# Patient Record
Sex: FEMALE | Race: WHITE | Employment: OTHER | URBAN - METROPOLITAN AREA
[De-identification: names, ages, dates, MRNs, and addresses within clinical notes are randomized per-mention and may not be internally consistent; named-entity substitution may affect disease eponyms.]

---

## 2018-08-10 ENCOUNTER — HOSPITAL ENCOUNTER (OUTPATIENT)
Dept: GENERAL RADIOLOGY | Age: 73
Discharge: HOME | End: 2018-08-10
Attending: INTERNAL MEDICINE | Admitting: INTERNAL MEDICINE

## 2018-08-10 LAB
ANION GAP SERPL CALCULATED.3IONS-SCNC: 13 MMOL/L (ref 4–16)
APTT: 30 SECONDS (ref 21.2–33)
BASOPHILS ABSOLUTE: 0 K/CU MM
BASOPHILS RELATIVE PERCENT: 0.8 % (ref 0–1)
BUN BLDV-MCNC: 18 MG/DL (ref 6–23)
CALCIUM SERPL-MCNC: 9.4 MG/DL (ref 8.3–10.6)
CHLORIDE BLD-SCNC: 94 MMOL/L (ref 99–110)
CO2: 31 MMOL/L (ref 21–32)
CREAT SERPL-MCNC: 0.9 MG/DL (ref 0.6–1.1)
DIFFERENTIAL TYPE: ABNORMAL
EOSINOPHILS ABSOLUTE: 0.1 K/CU MM
EOSINOPHILS RELATIVE PERCENT: 1 % (ref 0–3)
GFR AFRICAN AMERICAN: >60 ML/MIN/1.73M2
GFR NON-AFRICAN AMERICAN: >60 ML/MIN/1.73M2
GLUCOSE BLD-MCNC: 113 MG/DL (ref 70–99)
HCT VFR BLD CALC: 41.8 % (ref 37–47)
HEMOGLOBIN: 13.7 GM/DL (ref 12.5–16)
IMMATURE NEUTROPHIL %: 1 % (ref 0–0.43)
INR BLD: 0.91 INDEX
LYMPHOCYTES ABSOLUTE: 1.4 K/CU MM
LYMPHOCYTES RELATIVE PERCENT: 27.2 % (ref 24–44)
MCH RBC QN AUTO: 31.9 PG (ref 27–31)
MCHC RBC AUTO-ENTMCNC: 32.8 % (ref 32–36)
MCV RBC AUTO: 97.2 FL (ref 78–100)
MONOCYTES ABSOLUTE: 0.7 K/CU MM
MONOCYTES RELATIVE PERCENT: 14.2 % (ref 0–4)
NUCLEATED RBC %: 0 %
PDW BLD-RTO: 12.8 % (ref 11.7–14.9)
PLATELET # BLD: 264 K/CU MM (ref 140–440)
PMV BLD AUTO: 9 FL (ref 7.5–11.1)
POTASSIUM SERPL-SCNC: 3.4 MMOL/L (ref 3.5–5.1)
PROTHROMBIN TIME: 10.4 SECONDS (ref 9.12–12.5)
RBC # BLD: 4.3 M/CU MM (ref 4.2–5.4)
SEGMENTED NEUTROPHILS ABSOLUTE COUNT: 2.9 K/CU MM
SEGMENTED NEUTROPHILS RELATIVE PERCENT: 55.8 % (ref 36–66)
SODIUM BLD-SCNC: 138 MMOL/L (ref 135–145)
TOTAL IMMATURE NEUTOROPHIL: 0.05 K/CU MM
TOTAL NUCLEATED RBC: 0 K/CU MM
WBC # BLD: 5.2 K/CU MM (ref 4–10.5)

## 2018-10-15 ENCOUNTER — HOSPITAL ENCOUNTER (OUTPATIENT)
Dept: CT IMAGING | Age: 73
Discharge: HOME | End: 2018-10-15
Payer: COMMERCIAL

## 2018-10-15 ENCOUNTER — HOSPITAL ENCOUNTER (OUTPATIENT)
Dept: NUCLEAR MEDICINE | Age: 73
Discharge: HOME | End: 2018-10-15
Payer: COMMERCIAL

## 2018-10-15 ENCOUNTER — HOSPITAL ENCOUNTER (OUTPATIENT)
Dept: GENERAL RADIOLOGY | Age: 73
Discharge: HOME | End: 2018-10-15
Payer: COMMERCIAL

## 2018-10-15 ENCOUNTER — HOSPITAL ENCOUNTER (OUTPATIENT)
Age: 73
Discharge: HOME | End: 2018-10-15
Payer: COMMERCIAL

## 2018-10-15 DIAGNOSIS — C50.812 MALIGNANT NEOPLASM OF MIDLINE OF LEFT BREAST (HCC): ICD-10-CM

## 2018-10-15 DIAGNOSIS — C50.812 MALIGNANT NEOPLASM OF OVERLAPPING SITES OF LEFT FEMALE BREAST, UNSPECIFIED ESTROGEN RECEPTOR STATUS (HCC): ICD-10-CM

## 2018-10-15 LAB
GFR AFRICAN AMERICAN: >60 ML/MIN/1.73M2
GFR NON-AFRICAN AMERICAN: >60 ML/MIN/1.73M2
POC CREATININE: 0.9 MG/DL (ref 0.6–1.1)

## 2018-10-15 PROCEDURE — A9503 TC99M MEDRONATE: HCPCS | Performed by: SURGERY

## 2018-10-15 PROCEDURE — 74177 CT ABD & PELVIS W/CONTRAST: CPT

## 2018-10-15 PROCEDURE — 71046 X-RAY EXAM CHEST 2 VIEWS: CPT

## 2018-10-15 PROCEDURE — 6360000004 HC RX CONTRAST MEDICATION: Performed by: SURGERY

## 2018-10-15 PROCEDURE — 78306 BONE IMAGING WHOLE BODY: CPT

## 2018-10-15 PROCEDURE — 2580000003 HC RX 258: Performed by: SURGERY

## 2018-10-15 PROCEDURE — 3430000000 HC RX DIAGNOSTIC RADIOPHARMACEUTICAL: Performed by: SURGERY

## 2018-10-15 RX ORDER — TC 99M MEDRONATE 20 MG/10ML
25 INJECTION, POWDER, LYOPHILIZED, FOR SOLUTION INTRAVENOUS
Status: COMPLETED | OUTPATIENT
Start: 2018-10-15 | End: 2018-10-15

## 2018-10-15 RX ORDER — SODIUM CHLORIDE 0.9 % (FLUSH) 0.9 %
10 SYRINGE (ML) INJECTION PRN
Status: DISCONTINUED | OUTPATIENT
Start: 2018-10-15 | End: 2018-10-16 | Stop reason: HOSPADM

## 2018-10-15 RX ADMIN — IOPAMIDOL 75 ML: 755 INJECTION, SOLUTION INTRAVENOUS at 11:16

## 2018-10-15 RX ADMIN — SODIUM CHLORIDE, PRESERVATIVE FREE 10 ML: 5 INJECTION INTRAVENOUS at 11:16

## 2018-10-15 RX ADMIN — TC 99M MEDRONATE 25 MILLICURIE: 20 INJECTION, POWDER, LYOPHILIZED, FOR SOLUTION INTRAVENOUS at 10:30

## 2018-11-28 ENCOUNTER — HOSPITAL ENCOUNTER (OUTPATIENT)
Age: 73
Setting detail: SPECIMEN
Discharge: HOME | End: 2018-11-28
Payer: COMMERCIAL

## 2018-11-28 LAB
ALBUMIN SERPL-MCNC: 4.5 GM/DL (ref 3.4–5)
ALP BLD-CCNC: 73 IU/L (ref 40–128)
ALT SERPL-CCNC: 16 U/L (ref 10–40)
ANION GAP SERPL CALCULATED.3IONS-SCNC: 14 MMOL/L (ref 4–16)
AST SERPL-CCNC: 17 IU/L (ref 15–37)
BILIRUB SERPL-MCNC: 0.3 MG/DL (ref 0–1)
BUN BLDV-MCNC: 22 MG/DL (ref 6–23)
CALCIUM SERPL-MCNC: 9.6 MG/DL (ref 8.3–10.6)
CHLORIDE BLD-SCNC: 96 MMOL/L (ref 99–110)
CO2: 31 MMOL/L (ref 21–32)
CREAT SERPL-MCNC: 0.9 MG/DL (ref 0.6–1.1)
GFR AFRICAN AMERICAN: >60 ML/MIN/1.73M2
GFR NON-AFRICAN AMERICAN: >60 ML/MIN/1.73M2
GLUCOSE BLD-MCNC: 112 MG/DL (ref 70–99)
POTASSIUM SERPL-SCNC: 3.7 MMOL/L (ref 3.5–5.1)
SODIUM BLD-SCNC: 141 MMOL/L (ref 135–145)
TOTAL PROTEIN: 6.8 GM/DL (ref 6.4–8.2)

## 2018-11-28 PROCEDURE — 80053 COMPREHEN METABOLIC PANEL: CPT

## 2019-01-10 ENCOUNTER — HOSPITAL ENCOUNTER (OUTPATIENT)
Age: 74
Setting detail: SPECIMEN
Discharge: HOME | End: 2019-01-10
Payer: COMMERCIAL

## 2019-01-10 LAB
ALBUMIN SERPL-MCNC: 4.2 GM/DL (ref 3.4–5)
ALP BLD-CCNC: 91 IU/L (ref 40–128)
ALT SERPL-CCNC: 26 U/L (ref 10–40)
ANION GAP SERPL CALCULATED.3IONS-SCNC: 15 MMOL/L (ref 4–16)
AST SERPL-CCNC: 17 IU/L (ref 15–37)
BILIRUB SERPL-MCNC: 0.2 MG/DL (ref 0–1)
BUN BLDV-MCNC: 30 MG/DL (ref 6–23)
CALCIUM SERPL-MCNC: 9.2 MG/DL (ref 8.3–10.6)
CHLORIDE BLD-SCNC: 96 MMOL/L (ref 99–110)
CO2: 26 MMOL/L (ref 21–32)
CREAT SERPL-MCNC: 1 MG/DL (ref 0.6–1.1)
GFR AFRICAN AMERICAN: >60 ML/MIN/1.73M2
GFR NON-AFRICAN AMERICAN: 54 ML/MIN/1.73M2
GLUCOSE BLD-MCNC: 96 MG/DL (ref 70–99)
POTASSIUM SERPL-SCNC: 3.8 MMOL/L (ref 3.5–5.1)
SODIUM BLD-SCNC: 137 MMOL/L (ref 135–145)
TOTAL PROTEIN: 6.7 GM/DL (ref 6.4–8.2)

## 2019-01-10 PROCEDURE — 80053 COMPREHEN METABOLIC PANEL: CPT

## 2019-08-27 ENCOUNTER — HOSPITAL ENCOUNTER (OUTPATIENT)
Age: 74
Setting detail: SPECIMEN
Discharge: HOME | End: 2019-08-27
Payer: COMMERCIAL

## 2019-08-27 LAB
ALBUMIN SERPL-MCNC: 4.5 GM/DL (ref 3.4–5)
ALP BLD-CCNC: 55 IU/L (ref 40–128)
ALT SERPL-CCNC: 23 U/L (ref 10–40)
ANION GAP SERPL CALCULATED.3IONS-SCNC: 15 MMOL/L (ref 4–16)
AST SERPL-CCNC: 24 IU/L (ref 15–37)
BILIRUB SERPL-MCNC: 0.3 MG/DL (ref 0–1)
BUN BLDV-MCNC: 19 MG/DL (ref 6–23)
CALCIUM SERPL-MCNC: 9.7 MG/DL (ref 8.3–10.6)
CHLORIDE BLD-SCNC: 95 MMOL/L (ref 99–110)
CO2: 28 MMOL/L (ref 21–32)
CREAT SERPL-MCNC: 1 MG/DL (ref 0.6–1.1)
GFR AFRICAN AMERICAN: >60 ML/MIN/1.73M2
GFR NON-AFRICAN AMERICAN: 54 ML/MIN/1.73M2
GLUCOSE BLD-MCNC: 100 MG/DL (ref 70–99)
POTASSIUM SERPL-SCNC: 3.2 MMOL/L (ref 3.5–5.1)
SODIUM BLD-SCNC: 138 MMOL/L (ref 135–145)
TOTAL PROTEIN: 6.8 GM/DL (ref 6.4–8.2)

## 2019-08-27 PROCEDURE — 80053 COMPREHEN METABOLIC PANEL: CPT

## 2020-03-03 ENCOUNTER — HOSPITAL ENCOUNTER (OUTPATIENT)
Dept: INFUSION THERAPY | Age: 75
Discharge: HOME | End: 2020-03-03
Payer: COMMERCIAL

## 2020-03-03 LAB
ALBUMIN SERPL-MCNC: 4.2 GM/DL (ref 3.4–5)
ALP BLD-CCNC: 74 IU/L (ref 40–128)
ALT SERPL-CCNC: 11 U/L (ref 10–40)
ANION GAP SERPL CALCULATED.3IONS-SCNC: 15 MMOL/L (ref 4–16)
AST SERPL-CCNC: 21 IU/L (ref 15–37)
BASOPHILS ABSOLUTE: 0.1 K/CU MM
BASOPHILS RELATIVE PERCENT: 0.8 % (ref 0–1)
BILIRUB SERPL-MCNC: 0.2 MG/DL (ref 0–1)
BUN BLDV-MCNC: 25 MG/DL (ref 6–23)
CALCIUM SERPL-MCNC: 9.3 MG/DL (ref 8.3–10.6)
CHLORIDE BLD-SCNC: 101 MMOL/L (ref 99–110)
CO2: 24 MMOL/L (ref 21–32)
CREAT SERPL-MCNC: 1 MG/DL (ref 0.6–1.1)
DIFFERENTIAL TYPE: ABNORMAL
EOSINOPHILS ABSOLUTE: 0.2 K/CU MM
EOSINOPHILS RELATIVE PERCENT: 2.6 % (ref 0–3)
GFR AFRICAN AMERICAN: >60 ML/MIN/1.73M2
GFR NON-AFRICAN AMERICAN: 54 ML/MIN/1.73M2
GLUCOSE BLD-MCNC: 125 MG/DL (ref 70–99)
HCT VFR BLD CALC: 38.7 % (ref 37–47)
HEMOGLOBIN: 13.2 GM/DL (ref 12.5–16)
LYMPHOCYTES ABSOLUTE: 2 K/CU MM
LYMPHOCYTES RELATIVE PERCENT: 31.3 % (ref 24–44)
MCH RBC QN AUTO: 30.3 PG (ref 27–31)
MCHC RBC AUTO-ENTMCNC: 34.1 % (ref 32–36)
MCV RBC AUTO: 88.8 FL (ref 78–100)
MONOCYTES ABSOLUTE: 0.6 K/CU MM
MONOCYTES RELATIVE PERCENT: 8.8 % (ref 0–4)
PDW BLD-RTO: 13.5 % (ref 11.7–14.9)
PLATELET # BLD: 295 K/CU MM (ref 140–440)
PMV BLD AUTO: 9.2 FL (ref 7.5–11.1)
POTASSIUM SERPL-SCNC: 3.8 MMOL/L (ref 3.5–5.1)
RBC # BLD: 4.36 M/CU MM (ref 4.2–5.4)
SEGMENTED NEUTROPHILS ABSOLUTE COUNT: 3.7 K/CU MM
SEGMENTED NEUTROPHILS RELATIVE PERCENT: 56.5 % (ref 36–66)
SODIUM BLD-SCNC: 140 MMOL/L (ref 135–145)
TOTAL PROTEIN: 6.9 GM/DL (ref 6.4–8.2)
WBC # BLD: 6.5 K/CU MM (ref 4–10.5)

## 2020-03-03 PROCEDURE — 85025 COMPLETE CBC W/AUTO DIFF WBC: CPT

## 2020-03-03 PROCEDURE — 80053 COMPREHEN METABOLIC PANEL: CPT

## 2020-03-03 PROCEDURE — 36415 COLL VENOUS BLD VENIPUNCTURE: CPT

## 2020-08-14 PROBLEM — C50.412 MALIGNANT NEOPLASM OF UPPER-OUTER QUADRANT OF LEFT FEMALE BREAST (HCC): Status: ACTIVE | Noted: 2020-08-14

## 2020-09-28 ENCOUNTER — OFFICE VISIT (OUTPATIENT)
Dept: ONCOLOGY | Age: 75
End: 2020-09-28
Payer: COMMERCIAL

## 2020-09-28 ENCOUNTER — HOSPITAL ENCOUNTER (OUTPATIENT)
Dept: INFUSION THERAPY | Age: 75
Discharge: HOME | End: 2020-09-28
Payer: COMMERCIAL

## 2020-09-28 VITALS
BODY MASS INDEX: 34.47 KG/M2 | HEIGHT: 58 IN | DIASTOLIC BLOOD PRESSURE: 81 MMHG | HEART RATE: 73 BPM | WEIGHT: 164.2 LBS | TEMPERATURE: 97.4 F | SYSTOLIC BLOOD PRESSURE: 137 MMHG | OXYGEN SATURATION: 97 %

## 2020-09-28 PROCEDURE — 99214 OFFICE O/P EST MOD 30 MIN: CPT | Performed by: INTERNAL MEDICINE

## 2020-09-28 PROCEDURE — 99211 OFF/OP EST MAY X REQ PHY/QHP: CPT

## 2020-09-28 RX ORDER — TAMOXIFEN CITRATE 10 MG/1
10 TABLET ORAL DAILY
Qty: 90 TABLET | Refills: 5 | Status: SHIPPED | OUTPATIENT
Start: 2020-09-28 | End: 2020-11-18 | Stop reason: SDUPTHER

## 2020-09-28 ASSESSMENT — PATIENT HEALTH QUESTIONNAIRE - PHQ9
2. FEELING DOWN, DEPRESSED OR HOPELESS: 0
SUM OF ALL RESPONSES TO PHQ9 QUESTIONS 1 & 2: 0
SUM OF ALL RESPONSES TO PHQ QUESTIONS 1-9: 0
SUM OF ALL RESPONSES TO PHQ QUESTIONS 1-9: 0
1. LITTLE INTEREST OR PLEASURE IN DOING THINGS: 0

## 2020-09-28 NOTE — PROGRESS NOTES
MA Rooming Questions  Patient: Nay Amaya  MRN: [id number]    Date: 9/28/2020        1. Do you have any new issues? yes - broken radius and alna         2. Do you need any refills on medications?    no    3. Have you had any imaging done since your last visit? yes - labwork    4. Have you been hospitalized or seen in the emergency room since your last visit here?   no    5. Did the patient have a depression screening completed today?  Yes    PHQ-9 Total Score: 0 (9/28/2020  3:31 PM)       PHQ-9 Given to (if applicable):               PHQ-9 Score (if applicable):                     [] Positive     []  Negative              Does question #9 need addressed (if applicable)                     [] Yes    []  No               Adam Dent

## 2020-11-18 ENCOUNTER — TELEPHONE (OUTPATIENT)
Dept: ONCOLOGY | Age: 75
End: 2020-11-18

## 2020-11-18 RX ORDER — TAMOXIFEN CITRATE 10 MG/1
10 TABLET ORAL DAILY
Qty: 90 TABLET | Refills: 5 | Status: SHIPPED | OUTPATIENT
Start: 2020-11-18 | End: 2020-11-23

## 2020-11-23 RX ORDER — TAMOXIFEN CITRATE 20 MG/1
20 TABLET ORAL DAILY
Qty: 30 TABLET | Refills: 2 | Status: SHIPPED | OUTPATIENT
Start: 2020-11-23 | End: 2021-03-29 | Stop reason: SDUPTHER

## 2021-01-06 ENCOUNTER — TELEPHONE (OUTPATIENT)
Dept: ONCOLOGY | Age: 76
End: 2021-01-06

## 2021-01-08 NOTE — TELEPHONE ENCOUNTER
Called and spoke with patient who states she received a letter from [facility] that they were going to change her prescribed Tamoxifen from a Tier 1 medication to a Tier 2 which would cost her more. Called and spoke with Ana Foss at [facility] and she states that there is no change for the patient - that it will stay at a Tier 1 with a $5.00 monthly co-pay. Called and spoke with patient to inform her of this - voiced understanding. Instructed patient that if she receives another letter like this to call us back and we will call [facility] again.

## 2021-03-22 ENCOUNTER — HOSPITAL ENCOUNTER (OUTPATIENT)
Dept: INFUSION THERAPY | Age: 76
Discharge: HOME | End: 2021-03-22
Payer: COMMERCIAL

## 2021-03-22 DIAGNOSIS — C50.412 MALIGNANT NEOPLASM OF UPPER-OUTER QUADRANT OF LEFT FEMALE BREAST, UNSPECIFIED ESTROGEN RECEPTOR STATUS (HCC): ICD-10-CM

## 2021-03-22 LAB
ALBUMIN SERPL-MCNC: 4.2 GM/DL (ref 3.4–5)
ALP BLD-CCNC: 63 IU/L (ref 40–128)
ALT SERPL-CCNC: 13 U/L (ref 10–40)
ANION GAP SERPL CALCULATED.3IONS-SCNC: 13 MMOL/L (ref 4–16)
AST SERPL-CCNC: 16 IU/L (ref 15–37)
BASOPHILS ABSOLUTE: 0.1 K/CU MM
BASOPHILS RELATIVE PERCENT: 0.6 % (ref 0–1)
BILIRUB SERPL-MCNC: 0.2 MG/DL (ref 0–1)
BUN BLDV-MCNC: 21 MG/DL (ref 6–23)
CALCIUM SERPL-MCNC: 9.1 MG/DL (ref 8.3–10.6)
CHLORIDE BLD-SCNC: 97 MMOL/L (ref 99–110)
CO2: 27 MMOL/L (ref 21–32)
CREAT SERPL-MCNC: 1.1 MG/DL (ref 0.6–1.1)
DIFFERENTIAL TYPE: ABNORMAL
EOSINOPHILS ABSOLUTE: 0.2 K/CU MM
EOSINOPHILS RELATIVE PERCENT: 2.2 % (ref 0–3)
GFR AFRICAN AMERICAN: 58 ML/MIN/1.73M2
GFR NON-AFRICAN AMERICAN: 48 ML/MIN/1.73M2
GLUCOSE BLD-MCNC: 97 MG/DL (ref 70–99)
HCT VFR BLD CALC: 36.9 % (ref 37–47)
HEMOGLOBIN: 12.5 GM/DL (ref 12.5–16)
LYMPHOCYTES ABSOLUTE: 2.1 K/CU MM
LYMPHOCYTES RELATIVE PERCENT: 24 % (ref 24–44)
MCH RBC QN AUTO: 30 PG (ref 27–31)
MCHC RBC AUTO-ENTMCNC: 33.9 % (ref 32–36)
MCV RBC AUTO: 88.7 FL (ref 78–100)
MONOCYTES ABSOLUTE: 0.9 K/CU MM
MONOCYTES RELATIVE PERCENT: 10.6 % (ref 0–4)
PDW BLD-RTO: 13.5 % (ref 11.7–14.9)
PLATELET # BLD: 282 K/CU MM (ref 140–440)
PMV BLD AUTO: 8.9 FL (ref 7.5–11.1)
POTASSIUM SERPL-SCNC: 4 MMOL/L (ref 3.5–5.1)
RBC # BLD: 4.16 M/CU MM (ref 4.2–5.4)
SEGMENTED NEUTROPHILS ABSOLUTE COUNT: 5.4 K/CU MM
SEGMENTED NEUTROPHILS RELATIVE PERCENT: 62.6 % (ref 36–66)
SODIUM BLD-SCNC: 137 MMOL/L (ref 135–145)
TOTAL PROTEIN: 6.7 GM/DL (ref 6.4–8.2)
WBC # BLD: 8.6 K/CU MM (ref 4–10.5)

## 2021-03-22 PROCEDURE — 86300 IMMUNOASSAY TUMOR CA 15-3: CPT

## 2021-03-22 PROCEDURE — 85025 COMPLETE CBC W/AUTO DIFF WBC: CPT

## 2021-03-22 PROCEDURE — 80053 COMPREHEN METABOLIC PANEL: CPT

## 2021-03-22 PROCEDURE — 36415 COLL VENOUS BLD VENIPUNCTURE: CPT

## 2021-03-24 LAB — CA 27.29: 44.2 U/ML (ref 0–40)

## 2021-03-29 ENCOUNTER — HOSPITAL ENCOUNTER (OUTPATIENT)
Dept: INFUSION THERAPY | Age: 76
Discharge: HOME | End: 2021-03-29
Payer: COMMERCIAL

## 2021-03-29 ENCOUNTER — OFFICE VISIT (OUTPATIENT)
Dept: ONCOLOGY | Age: 76
End: 2021-03-29
Payer: COMMERCIAL

## 2021-03-29 VITALS
HEART RATE: 69 BPM | DIASTOLIC BLOOD PRESSURE: 74 MMHG | HEIGHT: 58 IN | WEIGHT: 169.2 LBS | BODY MASS INDEX: 35.52 KG/M2 | RESPIRATION RATE: 16 BRPM | TEMPERATURE: 98.6 F | SYSTOLIC BLOOD PRESSURE: 147 MMHG | OXYGEN SATURATION: 95 %

## 2021-03-29 DIAGNOSIS — C50.412 MALIGNANT NEOPLASM OF UPPER-OUTER QUADRANT OF LEFT FEMALE BREAST, UNSPECIFIED ESTROGEN RECEPTOR STATUS (HCC): Primary | ICD-10-CM

## 2021-03-29 DIAGNOSIS — Z78.0 MENOPAUSE: ICD-10-CM

## 2021-03-29 PROCEDURE — 99214 OFFICE O/P EST MOD 30 MIN: CPT | Performed by: INTERNAL MEDICINE

## 2021-03-29 PROCEDURE — 99211 OFF/OP EST MAY X REQ PHY/QHP: CPT

## 2021-03-29 RX ORDER — TAMOXIFEN CITRATE 20 MG/1
20 TABLET ORAL DAILY
Qty: 90 TABLET | Refills: 5 | Status: SHIPPED | OUTPATIENT
Start: 2021-03-29 | End: 2022-04-11

## 2021-03-29 NOTE — PROGRESS NOTES
MA Rooming Questions  Patient: Gareth Moreno  MRN: [id number]    Date: 3/29/2021        1. Do you have any new issues?   no         2. Do you need any refills on medications? yes - tamoxifen to [facility]    3. Have you had any imaging done since your last visit?   no    4. Have you been hospitalized or seen in the emergency room since your last visit here?   no    5. Did the patient have a depression screening completed today?  No    No data recorded     PHQ-9 Given to (if applicable):               PHQ-9 Score (if applicable):                     [] Positive     []  Negative              Does question #9 need addressed (if applicable)                     [] Yes    []  No               Lois Cobian MA

## 2021-03-29 NOTE — PROGRESS NOTES
Patient Name: Lawrence Boothe  Patient : 1945  Patient MRN: [id number]     Primary Oncologist: Arturo Power MD       Date of Service: 3/29/2021      Chief Complaint:   Chief Complaint   Patient presents with    Follow-up        Active Problem list  1. 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 .Mammogram done on 2018 revealed a suspicious lesion involving the left breast. Ultrasound and biopsy was done revealing invasive ductal carcinoma at 11 o'clock position subsequently she underwent bilateral mastectomy on 2018 right breast only showed fibrocystic changes left breast 8 mm invasive ductal carcinoma histologic grade 3 ER +85% MD +15% HER-2 by FISH was negative negative for any lymphovascular invasion 7 axillary nodes were negative [id number]. Visit here on 2018 did spend time with her and her friend. We basically talked about early stage carcinoma of the breast. We talked about most likely just treating her with hormonal agent primarily letrozole. Bone density done on 2018 had revealed mild osteopenia    She was seen in the office on January 10, 2019. Was taking letrozole on a regular basis. In general had tolerated those extremely well occasional issue with hot flashes otherwise no significant muscle or joint discomfort. She was seen in the office on 2019, per Piedad Goodwin for symptoms of significant worsening of muscle and joint discomfort that has started to affect her day-to-day life. Decision was made to hold off letrozole for a couple of weeks. She was seen in the office on 2019 being off of letrozole she was doing extremely well.  She was able to do almost all of her day-to-day activities. Denied any other specific new systemic complaints. March 28, 2019 long discussion with her. We discussed underlying disease process and overall good prognosis. We talked about treatment choices at this stage including changing to a different AI or tamoxifen or even no additional treatment. We felt comfortable trying tamoxifen as she did not have any previous history of clots, smoking etc..    Saw me on March 3, 2020. Stated she has started to experience a few more symptoms of hot flashes couple of weeks ago possibly relating to her stopping Paxil. Emotionally also she has felt somewhat down being off of Paxil. Otherwise physically she was doing about the same fairly active at [employer], still doing certification for 1 nursing programs. Still some chronic back issues for which she had seen Dr. Terry Rater negative for any other new systemic complaints still being followed by Dr. 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 Left hand 8/27/0:1.  Diffuse osteopenia. 2.  Scattered degenerative changes most pronounced of the first carpometacarpal joint. 3. Glenard Presto impacted intra-articular comminuted fracture of the distal radius. Left wrist:1.  Diffuse osteopenia. 2.  Nondisplaced fracture ulnar styloid process. 3.  Comminuted intra-articular mildly impacted fracture distal radius. 4.  Moderate to severe degenerative changes first carpometacarpal joint. Left forearm:Comminuted mildly impacted distal radius fracture. No other fractures of the radius and ulna. Diffuse osteopenia. Interval History  3/29/2021: Arrived alone to the clinic today. Overall feels fine. No chest wall lesions or nay axillary LAD. No weight loss.  Hot flashes well controlled with paxil. Is on vitamin D. Denied any chest pain, increased shortness of breath, palpitations or dizziness. Denied any dysphagia odynophagia. Denied any headache, vision changes or any focal weakness. Denied any abdominal pain, nausea, vomiting, diarrhea, constipation. Denied any lower extremity edema. No new bone pains. Review of Systems   Per interval history; otherwise 10 point ROS is negative              Vital Signs:  BP (!) 147/74 (Site: Right Upper Arm, Position: Sitting, Cuff Size: Large Adult)   Pulse 69   Temp 98.6 °F (37 °C) (Temporal)   Resp 16   Ht 4' 10\" (1.473 m)   Wt 169 lb 3.2 oz (76.7 kg)   SpO2 95%   BMI 35.36 kg/m²     Physical Exam:    CONSTITUTIONAL: awake, alert, short statured  EYES: KRISTEN, no palor or any icetrus  ENT: ATNC  NECK: No JVD  HEMATOLOGIC/LYMPHATIC: NO LAD  LUNGS: CTAB  CARDIOVASCULAR: s1s2 rrr ESM  ABDOMEN: soft ntnd bs pos  NEUROLOGIC:GI  SKIN: No rash  EXTREMITIES: no LE edema bilaterally  Bilateral mastectomy sites with no overlying skin changes.      Labs:    Hematology:  Lab Results   Component Value Date    WBC 8.6 03/22/2021    RBC 4.16 (L) 03/22/2021    HGB 12.5 03/22/2021    HCT 36.9 (L) 03/22/2021    MCV 88.7 03/22/2021    MCH 30.0 03/22/2021    MCHC 33.9 03/22/2021    RDW 13.5 03/22/2021     03/22/2021    MPV 8.9 03/22/2021    SEGSPCT 62.6 03/22/2021    EOSRELPCT 2.2 03/22/2021    BASOPCT 0.6 03/22/2021    LYMPHOPCT 24.0 03/22/2021    MONOPCT 10.6 (H) 03/22/2021    SEGSABS 5.4 03/22/2021    EOSABS 0.2 03/22/2021    BASOSABS 0.1 03/22/2021    LYMPHSABS 2.1 03/22/2021    MONOSABS 0.9 03/22/2021    DIFFTYPE AUTOMATED DIFFERENTIAL 03/22/2021     No results found for: ESR    Chemistry:  Lab Results   Component Value Date     03/22/2021    K 4.0 03/22/2021    CL 97 (L) 03/22/2021    CO2 27 03/22/2021    BUN 21 03/22/2021    CREATININE 1.1 03/22/2021    GLUCOSE 97 03/22/2021    CALCIUM 9.1 03/22/2021    PROT 6.7 03/22/2021    LABALBU 4.2 03/22/2021    BILITOT 0.2 03/22/2021    ALKPHOS 63 03/22/2021    AST 16 03/22/2021    ALT 13 03/22/2021    LABGLOM 48 (L) 03/22/2021    GFRAA 58 (L) 03/22/2021     No results found for: MMA, LDH, HOMOCYSTEINE  No components found for: LD  Lab Results   Component Value Date    TSHHS 0.022 (L) 08/06/2011    T4FREE 1.23 08/06/2011    FT3 3.0 08/06/2011       Immunology:  Lab Results   Component Value Date    PROT 6.7 03/22/2021     No results found for: Prieto Rook, KLFLCR  No results found for: B2M    Coagulation Panel:  Lab Results   Component Value Date    PROTIME 10.4 08/10/2018    INR 0.91 08/10/2018    APTT 30.0 08/10/2018       Anemia Panel:  No results found for: Negro Maldonado    Tumor Markers:  Lab Results   Component Value Date    LABCA2 44.2 (H) 03/22/2021         Imaging: Reviewed     Pathology:Reviewed     Observations:  Performance Status: ECOG 0  Depression Status: No data recorded          Assessment & Plan:  Stage I IBC left: ER/TN positive Her 2 negative. S/p bilateral mastectomies on October 18 2018. Was on letrozole but had significant AE with joint and muscle pains, hence changed to tamoxifen looks like since march 2019. Tolerating fairly well as long as she is on Paxil for hot flashes. BDS with osteopenia. Is on Vitamin D but not calcium. Repeat Bone density scan due in 2021. Tamoxifen  planned to continue for total 5 years. Note CA 27-29 slightly elevated but no Signs or sx of recurrence , will repeat levels. H/o Hyponatremia and hypokalemia: Is on K replacement. Last CMP normal  Continue other medical care    Discussed healthy lifestyle including healthy diet, regular exercise as tolerate and weight loss if possible. Also discussed the importance of age appropriate screening tools. Due for colonoscopy and PAP this year. Received COVID vaccines. Discussed the above findings and plan with her and answered all questions    Recommend follow up with PCP and other specialists. Please do not hesitate to contact us if you need any further information.     RTC September 2021  or earlier if new Sx    ALEX     Electronically signed by Jenni Alonso MD on 3/29/2021 at 3:04 PM

## 2021-06-29 ENCOUNTER — HOSPITAL ENCOUNTER (OUTPATIENT)
Dept: INFUSION THERAPY | Age: 76
Discharge: HOME | End: 2021-06-29
Payer: COMMERCIAL

## 2021-06-29 DIAGNOSIS — C50.412 MALIGNANT NEOPLASM OF UPPER-OUTER QUADRANT OF LEFT FEMALE BREAST, UNSPECIFIED ESTROGEN RECEPTOR STATUS (HCC): ICD-10-CM

## 2021-06-29 LAB
BASOPHILS ABSOLUTE: 0 K/CU MM
BASOPHILS RELATIVE PERCENT: 0.5 % (ref 0–1)
DIFFERENTIAL TYPE: ABNORMAL
EOSINOPHILS ABSOLUTE: 0.2 K/CU MM
EOSINOPHILS RELATIVE PERCENT: 2.3 % (ref 0–3)
HCT VFR BLD CALC: 37.8 % (ref 37–47)
HEMOGLOBIN: 12.5 GM/DL (ref 12.5–16)
LYMPHOCYTES ABSOLUTE: 1.8 K/CU MM
LYMPHOCYTES RELATIVE PERCENT: 20.9 % (ref 24–44)
MCH RBC QN AUTO: 29.7 PG (ref 27–31)
MCHC RBC AUTO-ENTMCNC: 33.1 % (ref 32–36)
MCV RBC AUTO: 89.8 FL (ref 78–100)
MONOCYTES ABSOLUTE: 0.9 K/CU MM
MONOCYTES RELATIVE PERCENT: 10.1 % (ref 0–4)
PDW BLD-RTO: 13.9 % (ref 11.7–14.9)
PLATELET # BLD: 307 K/CU MM (ref 140–440)
PMV BLD AUTO: 9.6 FL (ref 7.5–11.1)
RBC # BLD: 4.21 M/CU MM (ref 4.2–5.4)
SEGMENTED NEUTROPHILS ABSOLUTE COUNT: 5.6 K/CU MM
SEGMENTED NEUTROPHILS RELATIVE PERCENT: 66.2 % (ref 36–66)
WBC # BLD: 8.4 K/CU MM (ref 4–10.5)

## 2021-06-29 PROCEDURE — 36415 COLL VENOUS BLD VENIPUNCTURE: CPT

## 2021-06-29 PROCEDURE — 85025 COMPLETE CBC W/AUTO DIFF WBC: CPT

## 2021-06-29 PROCEDURE — 86300 IMMUNOASSAY TUMOR CA 15-3: CPT

## 2021-07-01 LAB — CA 27.29: 49.7 U/ML (ref 0–40)

## 2021-08-24 ENCOUNTER — CLINICAL DOCUMENTATION (OUTPATIENT)
Dept: ONCOLOGY | Age: 76
End: 2021-08-24

## 2021-09-20 ENCOUNTER — HOSPITAL ENCOUNTER (OUTPATIENT)
Dept: INFUSION THERAPY | Age: 76
Discharge: HOME | End: 2021-09-20
Payer: COMMERCIAL

## 2021-09-20 ENCOUNTER — HOSPITAL ENCOUNTER (OUTPATIENT)
Age: 76
Setting detail: SPECIMEN
Discharge: HOME | End: 2021-09-20
Payer: COMMERCIAL

## 2021-09-20 DIAGNOSIS — C50.412 MALIGNANT NEOPLASM OF UPPER-OUTER QUADRANT OF LEFT FEMALE BREAST, UNSPECIFIED ESTROGEN RECEPTOR STATUS (HCC): ICD-10-CM

## 2021-09-20 LAB
ALBUMIN SERPL-MCNC: 4.4 GM/DL (ref 3.4–5)
ALP BLD-CCNC: 58 IU/L (ref 40–128)
ALT SERPL-CCNC: 15 U/L (ref 10–40)
ANION GAP SERPL CALCULATED.3IONS-SCNC: 15 MMOL/L (ref 4–16)
AST SERPL-CCNC: 19 IU/L (ref 15–37)
BASOPHILS ABSOLUTE: 0 K/CU MM
BASOPHILS RELATIVE PERCENT: 0.4 % (ref 0–1)
BILIRUB SERPL-MCNC: 0.3 MG/DL (ref 0–1)
BUN BLDV-MCNC: 19 MG/DL (ref 6–23)
CALCIUM SERPL-MCNC: 8.9 MG/DL (ref 8.3–10.6)
CHLORIDE BLD-SCNC: 98 MMOL/L (ref 99–110)
CHOLESTEROL: 183 MG/DL
CO2: 26 MMOL/L (ref 21–32)
CREAT SERPL-MCNC: 1 MG/DL (ref 0.6–1.1)
DIFFERENTIAL TYPE: ABNORMAL
EOSINOPHILS ABSOLUTE: 0.2 K/CU MM
EOSINOPHILS RELATIVE PERCENT: 2.7 % (ref 0–3)
GFR AFRICAN AMERICAN: >60 ML/MIN/1.73M2
GFR NON-AFRICAN AMERICAN: 54 ML/MIN/1.73M2
GLUCOSE BLD-MCNC: 109 MG/DL (ref 70–99)
HCT VFR BLD CALC: 37.5 % (ref 37–47)
HDLC SERPL-MCNC: 52 MG/DL
HEMOGLOBIN: 12.4 GM/DL (ref 12.5–16)
LDL CHOLESTEROL DIRECT: 98 MG/DL
LYMPHOCYTES ABSOLUTE: 2.3 K/CU MM
LYMPHOCYTES RELATIVE PERCENT: 32.3 % (ref 24–44)
MCH RBC QN AUTO: 29.5 PG (ref 27–31)
MCHC RBC AUTO-ENTMCNC: 33.1 % (ref 32–36)
MCV RBC AUTO: 89.1 FL (ref 78–100)
MONOCYTES ABSOLUTE: 0.7 K/CU MM
MONOCYTES RELATIVE PERCENT: 10 % (ref 0–4)
PDW BLD-RTO: 13.9 % (ref 11.7–14.9)
PLATELET # BLD: 313 K/CU MM (ref 140–440)
PMV BLD AUTO: 9.1 FL (ref 7.5–11.1)
POTASSIUM SERPL-SCNC: 3.6 MMOL/L (ref 3.5–5.1)
RBC # BLD: 4.21 M/CU MM (ref 4.2–5.4)
SEGMENTED NEUTROPHILS ABSOLUTE COUNT: 3.9 K/CU MM
SEGMENTED NEUTROPHILS RELATIVE PERCENT: 54.6 % (ref 36–66)
SODIUM BLD-SCNC: 139 MMOL/L (ref 135–145)
T4 FREE: 0.97 NG/DL (ref 0.9–1.8)
TOTAL PROTEIN: 6.8 GM/DL (ref 6.4–8.2)
TRIGL SERPL-MCNC: 212 MG/DL
TSH HIGH SENSITIVITY: 1.2 UIU/ML (ref 0.27–4.2)
WBC # BLD: 7.1 K/CU MM (ref 4–10.5)

## 2021-09-20 PROCEDURE — 83721 ASSAY OF BLOOD LIPOPROTEIN: CPT

## 2021-09-20 PROCEDURE — 36415 COLL VENOUS BLD VENIPUNCTURE: CPT

## 2021-09-20 PROCEDURE — 85025 COMPLETE CBC W/AUTO DIFF WBC: CPT

## 2021-09-20 PROCEDURE — 84439 ASSAY OF FREE THYROXINE: CPT

## 2021-09-20 PROCEDURE — 86300 IMMUNOASSAY TUMOR CA 15-3: CPT

## 2021-09-20 PROCEDURE — 80053 COMPREHEN METABOLIC PANEL: CPT

## 2021-09-20 PROCEDURE — 84443 ASSAY THYROID STIM HORMONE: CPT

## 2021-09-20 PROCEDURE — 80061 LIPID PANEL: CPT

## 2021-09-22 LAB — CA 27.29: 54.2 U/ML

## 2021-09-27 ENCOUNTER — OFFICE VISIT (OUTPATIENT)
Dept: ONCOLOGY | Age: 76
End: 2021-09-27
Payer: COMMERCIAL

## 2021-09-27 ENCOUNTER — HOSPITAL ENCOUNTER (OUTPATIENT)
Dept: INFUSION THERAPY | Age: 76
Discharge: HOME | End: 2021-09-27
Payer: COMMERCIAL

## 2021-09-27 VITALS
HEIGHT: 58 IN | BODY MASS INDEX: 35.35 KG/M2 | SYSTOLIC BLOOD PRESSURE: 154 MMHG | RESPIRATION RATE: 18 BRPM | HEART RATE: 84 BPM | TEMPERATURE: 99 F | DIASTOLIC BLOOD PRESSURE: 72 MMHG | OXYGEN SATURATION: 98 % | WEIGHT: 168.4 LBS

## 2021-09-27 DIAGNOSIS — M85.89 OSTEOPENIA OF MULTIPLE SITES: ICD-10-CM

## 2021-09-27 DIAGNOSIS — C50.412 MALIGNANT NEOPLASM OF UPPER-OUTER QUADRANT OF LEFT FEMALE BREAST, UNSPECIFIED ESTROGEN RECEPTOR STATUS (HCC): Primary | ICD-10-CM

## 2021-09-27 PROCEDURE — 99211 OFF/OP EST MAY X REQ PHY/QHP: CPT

## 2021-09-27 PROCEDURE — 99214 OFFICE O/P EST MOD 30 MIN: CPT | Performed by: INTERNAL MEDICINE

## 2021-09-27 RX ORDER — TOLTERODINE 4 MG/1
CAPSULE, EXTENDED RELEASE ORAL
COMMUNITY
Start: 2021-08-07 | End: 2022-08-15

## 2021-09-27 NOTE — PROGRESS NOTES
MA Rooming Questions  Patient: Jeniffer Michelle  MRN: [id number]    Date: 9/27/2021        1. Do you have any new issues? yes - spot under right arm         2. Do you need any refills on medications?    no    3. Have you had any imaging done since your last visit? yes - Dexa    4. Have you been hospitalized or seen in the emergency room since your last visit here?   no    5. Did the patient have a depression screening completed today?  No    No data recorded     PHQ-9 Given to (if applicable):               PHQ-9 Score (if applicable):                     [] Positive     []  Negative              Does question #9 need addressed (if applicable)                     [] Yes    []  No               Sari Amato CMA

## 2021-09-27 NOTE — PROGRESS NOTES
extremely well. She was able to do almost all of her day-to-day activities. Denied any other specific new systemic complaints. March 28, 2019 long discussion with her. We discussed underlying disease process and overall good prognosis. We talked about treatment choices at this stage including changing to a different AI or tamoxifen or even no additional treatment. We felt comfortable trying tamoxifen as she did not have any previous history of clots, smoking etc..    Saw me on March 3, 2020. Stated she has started to experience a few more symptoms of hot flashes couple of weeks ago possibly relating to her stopping Paxil. Emotionally also she has felt somewhat down being off of Paxil. 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 Still some chronic back issues for which she had seen Dr. Anh Marlow negative for any other new systemic complaints still being followed by Dr. 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 Left hand 8/27/0:1.  Diffuse osteopenia. 2.  Scattered degenerative changes most pronounced of the first carpometacarpal joint. 3. Williams Hema impacted intra-articular comminuted fracture of the distal radius. Left wrist:1.  Diffuse osteopenia. 2.  Nondisplaced fracture ulnar styloid process. 3.  Comminuted intra-articular mildly impacted fracture distal radius. 4.  Moderate to severe degenerative changes first carpometacarpal joint. Left forearm:Comminuted mildly impacted distal radius fracture. No other fractures of the radius and ulna. Diffuse osteopenia. 9/2021: Dexa scan with osteopenia. Interval History  9/27/2021: Arrived alone to the clinic today. Overall feels fine.  No chest wall lesions. Reported that she was told that she has Lipoma right axilla which has been growing in size. No pain. No  axillary LAD. No weight loss. Hot flashes tolerable other that few days of heavy sweats. Taking Paxil,helping some. Is on vitamin D. Denied any chest pain, increased shortness of breath, palpitations or dizziness. Denied any dysphagia odynophagia. Denied any headache, vision changes or any focal weakness. Denied any abdominal pain, nausea, vomiting, diarrhea, constipation. Denied any lower extremity edema. No new bone pains. Review of Systems   Per interval history; otherwise 10 point ROS is negative              Vital Signs:  BP (!) 154/72 (Site: Right Upper Arm, Position: Sitting, Cuff Size: Large Adult)   Pulse 84   Temp 99 °F (37.2 °C) (Temporal)   Resp 18   Ht 4' 10\" (1.473 m)   Wt 168 lb 6.4 oz (76.4 kg)   SpO2 98%   BMI 35.20 kg/m²     Physical Exam:    CONSTITUTIONAL: awake, alert, short statured  EYES: KRISTEN, no palor or any icetrus  ENT: ATNC  NECK: No JVD  HEMATOLOGIC/LYMPHATIC: NO LAD  LUNGS: CTAB  CARDIOVASCULAR: s1s2 rrr ESM  ABDOMEN: soft ntnd bs pos  NEUROLOGIC:GI  SKIN: No rash  EXTREMITIES: no LE edema bilaterally  Bilateral mastectomy sites with no overlying skin changes. No axillary lad bilaterally but ?lipoma left axillary area.  Non tender    Labs:    Hematology:  Lab Results   Component Value Date    WBC 7.1 09/20/2021    RBC 4.21 09/20/2021    HGB 12.4 (L) 09/20/2021    HCT 37.5 09/20/2021    MCV 89.1 09/20/2021    MCH 29.5 09/20/2021    MCHC 33.1 09/20/2021    RDW 13.9 09/20/2021     09/20/2021    MPV 9.1 09/20/2021    SEGSPCT 54.6 09/20/2021    EOSRELPCT 2.7 09/20/2021    BASOPCT 0.4 09/20/2021    LYMPHOPCT 32.3 09/20/2021    MONOPCT 10.0 (H) 09/20/2021    SEGSABS 3.9 09/20/2021    EOSABS 0.2 09/20/2021    BASOSABS 0.0 09/20/2021    LYMPHSABS 2.3 09/20/2021    MONOSABS 0.7 09/20/2021    DIFFTYPE AUTOMATED DIFFERENTIAL 09/20/2021     No results found for: ESR    Chemistry:  Lab Results   Component Value Date     09/20/2021    K 3.6 09/20/2021    CL 98 (L) 09/20/2021    CO2 26 09/20/2021    BUN 19 09/20/2021    CREATININE 1.0 09/20/2021    GLUCOSE 109 (H) 09/20/2021    CALCIUM 8.9 09/20/2021    PROT 6.8 09/20/2021    LABALBU 4.4 09/20/2021    BILITOT 0.3 09/20/2021    ALKPHOS 58 09/20/2021    AST 19 09/20/2021    ALT 15 09/20/2021    LABGLOM 54 (L) 09/20/2021    GFRAA >60 09/20/2021     No results found for: MMA, LDH, HOMOCYSTEINE  No components found for: LD  Lab Results   Component Value Date    TSHHS 1.200 09/20/2021    T4FREE 0.97 09/20/2021    FT3 3.0 08/06/2011       Immunology:  Lab Results   Component Value Date    PROT 6.8 09/20/2021     No results found for: Serjio Peoples, KLFLCR  No results found for: B2M    Coagulation Panel:  Lab Results   Component Value Date    PROTIME 10.4 08/10/2018    INR 0.91 08/10/2018    APTT 30.0 08/10/2018       Anemia Panel:  No results found for: [id number], FOLATE    Tumor Markers:  Lab Results   Component Value Date    LABCA2 54.2 (H) 09/20/2021         Imaging: Reviewed     Pathology:Reviewed     Observations:  Performance Status: ECOG 0  Depression Status: No data recorded          Assessment & Plan:  Stage I IBC left: ER/NM positive Her 2 negative. S/p bilateral mastectomies on October 18 2018. Was on letrozole but had significant AE with joint and muscle pains, hence changed to tamoxifen looks like since march 2019. Tolerating fairly well as long as she is on Paxil for hot flashes. BDS with osteopenia. Is on Vitamin D but not calcium. Repeat Bone density scan 2021 with osteopenia. Tamoxifen  planned to continue for total 5 years, until 2024. Note CA 27-29 continue to minimally rise. But no Signs or sx of recurrence. Recommend ultrasound rt axillary area for further evaluation    Osteopenia: Is on Vitamin D    H/O Hypokalemia: Most probably sec to Thiazide.     Hypertension: Most probably as she was rushing here from dentist office. But maintain LOG and salt restriction. Increased activity as tolerated. Discussed healthy lifestyle including healthy diet, regular exercise as tolerate and weight loss if possible. Also discussed the importance of age appropriate screening tools. Due for colonoscopy and PAP this year. Received COVID vaccines. Discussed the above findings and plan with her and answered all questions    Recommend follow up with PCP and other specialists. Please do not hesitate to contact us if you need any further information.     RTC jan 2021  or earlier if new Syung    ALEX

## 2021-09-29 ENCOUNTER — CLINICAL DOCUMENTATION (OUTPATIENT)
Dept: ONCOLOGY | Age: 76
End: 2021-09-29

## 2021-09-29 DIAGNOSIS — R22.31 LUMP OF AXILLA, RIGHT: Primary | ICD-10-CM

## 2021-09-29 NOTE — PROGRESS NOTES
Per Dr. Raymond Peterson - order placed for right axilla ultrasound to be scheduled at [facility].

## 2021-11-02 RX ORDER — PAROXETINE HYDROCHLORIDE 20 MG/1
TABLET, FILM COATED ORAL
Qty: 194 TABLET | Refills: 3 | Status: SHIPPED | OUTPATIENT
Start: 2021-11-02 | End: 2022-08-15 | Stop reason: SDUPTHER

## 2022-01-28 ENCOUNTER — HOSPITAL ENCOUNTER (OUTPATIENT)
Dept: INFUSION THERAPY | Age: 77
Discharge: HOME | End: 2022-01-28
Payer: COMMERCIAL

## 2022-01-28 DIAGNOSIS — C50.412 MALIGNANT NEOPLASM OF UPPER-OUTER QUADRANT OF LEFT FEMALE BREAST, UNSPECIFIED ESTROGEN RECEPTOR STATUS (HCC): ICD-10-CM

## 2022-01-28 DIAGNOSIS — M85.89 OSTEOPENIA OF MULTIPLE SITES: ICD-10-CM

## 2022-01-28 LAB
ALBUMIN SERPL-MCNC: 4.1 GM/DL (ref 3.4–5)
ALP BLD-CCNC: 69 IU/L (ref 40–128)
ALT SERPL-CCNC: 12 U/L (ref 10–40)
ANION GAP SERPL CALCULATED.3IONS-SCNC: 14 MMOL/L (ref 4–16)
AST SERPL-CCNC: 20 IU/L (ref 15–37)
BASOPHILS ABSOLUTE: 0 K/CU MM
BASOPHILS RELATIVE PERCENT: 0.4 % (ref 0–1)
BILIRUB SERPL-MCNC: 0.2 MG/DL (ref 0–1)
BUN BLDV-MCNC: 22 MG/DL (ref 6–23)
CALCIUM SERPL-MCNC: 9 MG/DL (ref 8.3–10.6)
CHLORIDE BLD-SCNC: 100 MMOL/L (ref 99–110)
CO2: 26 MMOL/L (ref 21–32)
CREAT SERPL-MCNC: 0.9 MG/DL (ref 0.6–1.1)
DIFFERENTIAL TYPE: ABNORMAL
EOSINOPHILS ABSOLUTE: 0.1 K/CU MM
EOSINOPHILS RELATIVE PERCENT: 1.6 % (ref 0–3)
GFR AFRICAN AMERICAN: >60 ML/MIN/1.73M2
GFR NON-AFRICAN AMERICAN: >60 ML/MIN/1.73M2
GLUCOSE BLD-MCNC: 104 MG/DL (ref 70–99)
HCT VFR BLD CALC: 38.6 % (ref 37–47)
HEMOGLOBIN: 12.7 GM/DL (ref 12.5–16)
LYMPHOCYTES ABSOLUTE: 2.1 K/CU MM
LYMPHOCYTES RELATIVE PERCENT: 29.5 % (ref 24–44)
MCH RBC QN AUTO: 29.6 PG (ref 27–31)
MCHC RBC AUTO-ENTMCNC: 32.9 % (ref 32–36)
MCV RBC AUTO: 90 FL (ref 78–100)
MONOCYTES ABSOLUTE: 0.8 K/CU MM
MONOCYTES RELATIVE PERCENT: 10.7 % (ref 0–4)
PDW BLD-RTO: 14.9 % (ref 11.7–14.9)
PLATELET # BLD: 296 K/CU MM (ref 140–440)
PMV BLD AUTO: 8.9 FL (ref 7.5–11.1)
POTASSIUM SERPL-SCNC: 3.7 MMOL/L (ref 3.5–5.1)
RBC # BLD: 4.29 M/CU MM (ref 4.2–5.4)
SEGMENTED NEUTROPHILS ABSOLUTE COUNT: 4.1 K/CU MM
SEGMENTED NEUTROPHILS RELATIVE PERCENT: 57.8 % (ref 36–66)
SODIUM BLD-SCNC: 140 MMOL/L (ref 135–145)
TOTAL PROTEIN: 6.8 GM/DL (ref 6.4–8.2)
WBC # BLD: 7.1 K/CU MM (ref 4–10.5)

## 2022-01-28 PROCEDURE — 36415 COLL VENOUS BLD VENIPUNCTURE: CPT

## 2022-01-28 PROCEDURE — 85025 COMPLETE CBC W/AUTO DIFF WBC: CPT

## 2022-01-28 PROCEDURE — 86300 IMMUNOASSAY TUMOR CA 15-3: CPT

## 2022-01-28 PROCEDURE — 80053 COMPREHEN METABOLIC PANEL: CPT

## 2022-02-03 LAB — CA 27.29: 43.4 U/ML

## 2022-02-15 ENCOUNTER — HOSPITAL ENCOUNTER (OUTPATIENT)
Dept: INFUSION THERAPY | Age: 77
Discharge: HOME | End: 2022-02-15

## 2022-02-15 ENCOUNTER — OFFICE VISIT (OUTPATIENT)
Dept: ONCOLOGY | Age: 77
End: 2022-02-15
Payer: COMMERCIAL

## 2022-02-15 VITALS
TEMPERATURE: 99 F | DIASTOLIC BLOOD PRESSURE: 62 MMHG | HEIGHT: 58 IN | OXYGEN SATURATION: 96 % | SYSTOLIC BLOOD PRESSURE: 133 MMHG | RESPIRATION RATE: 18 BRPM | HEART RATE: 80 BPM | WEIGHT: 166.6 LBS | BODY MASS INDEX: 34.97 KG/M2

## 2022-02-15 DIAGNOSIS — C50.412 MALIGNANT NEOPLASM OF UPPER-OUTER QUADRANT OF LEFT FEMALE BREAST, UNSPECIFIED ESTROGEN RECEPTOR STATUS (HCC): Primary | ICD-10-CM

## 2022-02-15 DIAGNOSIS — M85.89 OSTEOPENIA OF MULTIPLE SITES: ICD-10-CM

## 2022-02-15 PROCEDURE — 99214 OFFICE O/P EST MOD 30 MIN: CPT | Performed by: INTERNAL MEDICINE

## 2022-02-15 RX ORDER — TRAMADOL HYDROCHLORIDE 50 MG/1
TABLET ORAL
COMMUNITY
Start: 2022-02-01 | End: 2022-08-15

## 2022-02-15 RX ORDER — RISEDRONATE SODIUM 150 MG/1
150 TABLET, FILM COATED ORAL
COMMUNITY
Start: 2022-02-02 | End: 2022-08-15

## 2022-02-15 RX ORDER — HYDROCODONE BITARTRATE AND ACETAMINOPHEN 5; 325 MG/1; MG/1
TABLET ORAL
COMMUNITY
Start: 2022-02-10 | End: 2022-08-15

## 2022-02-15 ASSESSMENT — PATIENT HEALTH QUESTIONNAIRE - PHQ9
1. LITTLE INTEREST OR PLEASURE IN DOING THINGS: 0
SUM OF ALL RESPONSES TO PHQ9 QUESTIONS 1 & 2: 0
SUM OF ALL RESPONSES TO PHQ QUESTIONS 1-9: 0
2. FEELING DOWN, DEPRESSED OR HOPELESS: 0
SUM OF ALL RESPONSES TO PHQ QUESTIONS 1-9: 0

## 2022-02-15 NOTE — PROGRESS NOTES
MA Rooming Questions  Patient: Cornell Fink  MRN: [id number]    Date: 2/15/2022        1. Do you have any new issues?   no         2. Do you need any refills on medications? yes - Tamoxifen    3. Have you had any imaging done since your last visit? yes - in media tab    4. Have you been hospitalized or seen in the emergency room since your last visit here?   yes - had surgery on back    5. Did the patient have a depression screening completed today?  Yes    No data recorded     PHQ-9 Given to (if applicable):               PHQ-9 Score (if applicable):                     [] Positive     [x]  Negative              Does question #9 need addressed (if applicable)                     [] Yes    []  No               Rina Garcia CMA

## 2022-02-15 NOTE — PROGRESS NOTES
Patient Name: Danial Gray  Patient : 1945  Patient MRN: [id number]     Primary Oncologist: Tricia Gilmore MD       Date of Service: 2/15/2022      Chief Complaint:   Chief Complaint   Patient presents with    Follow-up        Active Problem list  1. Malignant neoplasm of upper-outer quadrant of left female breast, unspecified estrogen receptor status ([address]    2. Osteopenia of multiple sites           HPI:   Dr Bourne How notes  -----------------------       Extremely pleasant lady retired RN, previous history of having had lumpectomy and radiation therapy for DCIS involving the right breast in year . .Mammogram done on 2018 revealed a suspicious lesion involving the left breast. Ultrasound and biopsy was done revealing invasive ductal carcinoma at 11 o'clock position subsequently she underwent bilateral mastectomy on 2018 right breast only showed fibrocystic changes left breast 8 mm invasive ductal carcinoma histologic grade 3 ER +85% VA +15% HER-2 by FISH was negative negative for any lymphovascular invasion 7 axillary nodes were negative [id number]. Visit here on 2018 did spend time with her and her friend. We basically talked about early stage carcinoma of the breast. We talked about most likely just treating her with hormonal agent primarily letrozole. Bone density done on 2018 had revealed mild osteopenia    She was seen in the office on January 10, 2019. Was taking letrozole on a regular basis. In general had tolerated those extremely well occasional issue with hot flashes otherwise no significant muscle or joint discomfort. She was seen in the office on 2019, per Lou Obregon for symptoms of significant worsening of muscle and joint discomfort that has started to affect her day-to-day life. Decision was made to hold off letrozole for a couple of weeks.     She was seen in the office on 2019 being off of letrozole she was doing extremely well. She was able to do almost all of her day-to-day activities. Denied any other specific new systemic complaints. March 28, 2019 long discussion with her. We discussed underlying disease process and overall good prognosis. We talked about treatment choices at this stage including changing to a different AI or tamoxifen or even no additional treatment. We felt comfortable trying tamoxifen as she did not have any previous history of clots, smoking etc..    Saw me on March 3, 2020. Stated she has started to experience a few more symptoms of hot flashes couple of weeks ago possibly relating to her stopping Paxil. Emotionally also she has felt somewhat down being off of Paxil. Otherwise physically she was doing about the same fairly active at [employer], still doing certification for 1 nursing programs. Still some chronic back issues for which she had seen Dr. Mamta Elliott negative for any other new systemic complaints still being followed by Dr. 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 Left hand 8/27/0:1.  Diffuse osteopenia. 2.  Scattered degenerative changes most pronounced of the first carpometacarpal joint. 3. Corrie Merchant impacted intra-articular comminuted fracture of the distal radius. Left wrist:1.  Diffuse osteopenia. 2.  Nondisplaced fracture ulnar styloid process. 3.  Comminuted intra-articular mildly impacted fracture distal radius. 4.  Moderate to severe degenerative changes first carpometacarpal joint. Left forearm:Comminuted mildly impacted distal radius fracture. No other fractures of the radius and ulna. Diffuse osteopenia. 9/2021: Dexa scan with osteopenia.      Dec 2021 mechanical fall    Rodolfo 10 2022 with degenerative disease, spinal stenosis and T11 and L4 compression fracture. Interval History  2/15/2022: Arrived alone to the clinic today. Underwent vertebroplasty on feb 9 2022. No pain from that but wearing the back brace. Started actonel. No chest wall lesions. No  axillary LAD. No weight loss. Night sweats, Taking Paxil, does not seem like helping. Denied any chest pain, increased shortness of breath, palpitations or dizziness. Denied any dysphagia odynophagia. Denied any headache, vision changes or any focal weakness. Denied any abdominal pain, nausea, vomiting, diarrhea, constipation. Denied any lower extremity edema. Review of Systems   Per interval history; otherwise 10 point ROS is negative              Vital Signs:  /62 (Site: Right Upper Arm, Position: Sitting, Cuff Size: Large Adult)   Pulse 80   Temp 99 °F (37.2 °C) (Temporal)   Resp 18   Ht 4' 10\" (1.473 m)   Wt 166 lb 9.6 oz (75.6 kg)   SpO2 96%   BMI 34.82 kg/m²     Physical Exam:    CONSTITUTIONAL: awake, alert, short statured  EYES: KRISTEN, no palor or any icetrus  ENT: ATNC  NECK: No JVD  HEMATOLOGIC/LYMPHATIC: NO LAD  LUNGS: CTAB  CARDIOVASCULAR: s1s2 rrr ESM  ABDOMEN: soft ntnd bs pos  NEUROLOGIC:GI  SKIN: No rash  EXTREMITIES: no LE edema bilaterally  Bilateral mastectomy sites with no overlying skin changes.  No axillary lad bilaterally    Labs:    Hematology:  Lab Results   Component Value Date    WBC 7.1 01/28/2022    RBC 4.29 01/28/2022    HGB 12.7 01/28/2022    HCT 38.6 01/28/2022    MCV 90.0 01/28/2022    MCH 29.6 01/28/2022    MCHC 32.9 01/28/2022    RDW 14.9 01/28/2022     01/28/2022    MPV 8.9 01/28/2022    SEGSPCT 57.8 01/28/2022    EOSRELPCT 1.6 01/28/2022    BASOPCT 0.4 01/28/2022    LYMPHOPCT 29.5 01/28/2022    MONOPCT 10.7 (H) 01/28/2022    SEGSABS 4.1 01/28/2022    EOSABS 0.1 01/28/2022    BASOSABS 0.0 01/28/2022    LYMPHSABS 2.1 01/28/2022    MONOSABS 0.8 01/28/2022    DIFFTYPE AUTOMATED DIFFERENTIAL 01/28/2022     No results found for: ESR    Chemistry:  Lab Results   Component Value Date     01/28/2022    K 3.7 01/28/2022     01/28/2022    CO2 26 01/28/2022    BUN 22 01/28/2022    CREATININE 0.9 01/28/2022    GLUCOSE 104 (H) 01/28/2022    CALCIUM 9.0 01/28/2022    PROT 6.8 01/28/2022    LABALBU 4.1 01/28/2022    BILITOT 0.2 01/28/2022    ALKPHOS 69 01/28/2022    AST 20 01/28/2022    ALT 12 01/28/2022    LABGLOM >60 01/28/2022    GFRAA >60 01/28/2022     No results found for: MMA, LDH, HOMOCYSTEINE  No components found for: LD  Lab Results   Component Value Date    TSHHS 1.200 09/20/2021    T4FREE 0.97 09/20/2021    FT3 3.0 08/06/2011       Immunology:  Lab Results   Component Value Date    PROT 6.8 01/28/2022     No results found for: Janice Stockton KLFLCR  No results found for: B2M    Coagulation Panel:  Lab Results   Component Value Date    PROTIME 10.4 08/10/2018    INR 0.91 08/10/2018    APTT 30.0 08/10/2018       Anemia Panel:  No results found for: [id number], FOLATE    Tumor Markers:  Lab Results   Component Value Date    LABCA2 43.4 (H) 01/28/2022         Imaging: Reviewed     Pathology:Reviewed     Observations:  Performance Status: ECOG 0  Depression Status: PHQ-9 Total Score: 0 (2/15/2022  1:21 PM)          Assessment & Plan:  Stage I IBC left: ER/ID positive Her 2 negative. S/p bilateral mastectomies on October 18 2018. Was on letrozole but had significant AE with joint and muscle pains, hence changed to tamoxifen looks like since march 2019. Tolerating fairly well as long as she is on Paxil for hot flashes. BDS with osteopenia. Is on Vitamin D but not calcium. Repeat Bone density scan 2021 with osteopenia. Tamoxifen  planned to continue for total 5 years, until 2024. Note CA 27-29 with decline to 37 in jan 2022. Will continue to follow per NCCN guidelines. Osteopenia/psteoporosis/compression fractures: s/p vertebroplasty. Is on Vitamin D. Also started on risedronate.  Discussed regarding bisphosphonates    H/O Hypokalemia: Most probably sec to Thiazide. Hypertension: Most probably as she was rushing here from dentist office. But maintain LOG and salt restriction. Increased activity as tolerated. Discussed healthy lifestyle including healthy diet, regular exercise as tolerate and weight loss if possible. Also discussed the importance of age appropriate screening tools. Colonoscopy recommended. Or atleast stool studies. Discussed the above findings and plan with her and answered all questions    Recommend follow up with PCP and other specialists. Please do not hesitate to contact us if you need any further information.     RTC august 2022 or earlier if new Sx    ALEX

## 2022-04-11 RX ORDER — TAMOXIFEN CITRATE 20 MG/1
20 TABLET ORAL DAILY
Qty: 90 TABLET | Refills: 1 | Status: SHIPPED | OUTPATIENT
Start: 2022-04-11 | End: 2022-08-15 | Stop reason: SDUPTHER

## 2022-08-08 ENCOUNTER — HOSPITAL ENCOUNTER (OUTPATIENT)
Dept: INFUSION THERAPY | Age: 77
Discharge: HOME | End: 2022-08-08
Payer: COMMERCIAL

## 2022-08-08 DIAGNOSIS — C50.412 MALIGNANT NEOPLASM OF UPPER-OUTER QUADRANT OF LEFT FEMALE BREAST, UNSPECIFIED ESTROGEN RECEPTOR STATUS (HCC): ICD-10-CM

## 2022-08-08 DIAGNOSIS — M85.89 OSTEOPENIA OF MULTIPLE SITES: ICD-10-CM

## 2022-08-08 LAB
ALBUMIN SERPL-MCNC: 4.5 GM/DL (ref 3.4–5)
ALP BLD-CCNC: 44 IU/L (ref 40–129)
ALT SERPL-CCNC: 13 U/L (ref 10–40)
ANION GAP SERPL CALCULATED.3IONS-SCNC: 11 MMOL/L (ref 4–16)
AST SERPL-CCNC: 20 IU/L (ref 15–37)
BASOPHILS ABSOLUTE: 0 K/CU MM
BASOPHILS RELATIVE PERCENT: 0.5 % (ref 0–1)
BILIRUB SERPL-MCNC: 0.3 MG/DL (ref 0–1)
BUN BLDV-MCNC: 17 MG/DL (ref 6–23)
CALCIUM SERPL-MCNC: 9 MG/DL (ref 8.3–10.6)
CHLORIDE BLD-SCNC: 97 MMOL/L (ref 99–110)
CO2: 28 MMOL/L (ref 21–32)
CREAT SERPL-MCNC: 0.9 MG/DL (ref 0.6–1.1)
DIFFERENTIAL TYPE: ABNORMAL
EOSINOPHILS ABSOLUTE: 0.1 K/CU MM
EOSINOPHILS RELATIVE PERCENT: 1.3 % (ref 0–3)
GFR AFRICAN AMERICAN: >60 ML/MIN/1.73M2
GFR NON-AFRICAN AMERICAN: >60 ML/MIN/1.73M2
GLUCOSE BLD-MCNC: 98 MG/DL (ref 70–99)
HCT VFR BLD CALC: 37.9 % (ref 37–47)
HEMOGLOBIN: 12.3 GM/DL (ref 12.5–16)
LYMPHOCYTES ABSOLUTE: 2 K/CU MM
LYMPHOCYTES RELATIVE PERCENT: 33.1 % (ref 24–44)
MCH RBC QN AUTO: 30 PG (ref 27–31)
MCHC RBC AUTO-ENTMCNC: 32.5 % (ref 32–36)
MCV RBC AUTO: 92.4 FL (ref 78–100)
MONOCYTES ABSOLUTE: 0.7 K/CU MM
MONOCYTES RELATIVE PERCENT: 11.9 % (ref 0–4)
PDW BLD-RTO: 13.7 % (ref 11.7–14.9)
PLATELET # BLD: 259 K/CU MM (ref 140–440)
PMV BLD AUTO: 9.2 FL (ref 7.5–11.1)
POTASSIUM SERPL-SCNC: 3.4 MMOL/L (ref 3.5–5.1)
RBC # BLD: 4.1 M/CU MM (ref 4.2–5.4)
SEGMENTED NEUTROPHILS ABSOLUTE COUNT: 3.2 K/CU MM
SEGMENTED NEUTROPHILS RELATIVE PERCENT: 53.2 % (ref 36–66)
SODIUM BLD-SCNC: 136 MMOL/L (ref 135–145)
TOTAL PROTEIN: 6.8 GM/DL (ref 6.4–8.2)
WBC # BLD: 6 K/CU MM (ref 4–10.5)

## 2022-08-08 PROCEDURE — 85025 COMPLETE CBC W/AUTO DIFF WBC: CPT

## 2022-08-08 PROCEDURE — 36415 COLL VENOUS BLD VENIPUNCTURE: CPT

## 2022-08-08 PROCEDURE — 80053 COMPREHEN METABOLIC PANEL: CPT

## 2022-08-08 PROCEDURE — 86300 IMMUNOASSAY TUMOR CA 15-3: CPT

## 2022-08-10 LAB — CA 27.29: 49.9 U/ML

## 2022-08-15 ENCOUNTER — HOSPITAL ENCOUNTER (OUTPATIENT)
Dept: INFUSION THERAPY | Age: 77
End: 2022-08-15

## 2022-08-15 ENCOUNTER — OFFICE VISIT (OUTPATIENT)
Dept: ONCOLOGY | Age: 77
End: 2022-08-15
Payer: COMMERCIAL

## 2022-08-15 VITALS
HEART RATE: 47 BPM | TEMPERATURE: 97.8 F | HEIGHT: 58 IN | OXYGEN SATURATION: 96 % | BODY MASS INDEX: 34.3 KG/M2 | WEIGHT: 163.4 LBS | DIASTOLIC BLOOD PRESSURE: 61 MMHG | SYSTOLIC BLOOD PRESSURE: 132 MMHG

## 2022-08-15 DIAGNOSIS — Z78.0 MENOPAUSE: ICD-10-CM

## 2022-08-15 DIAGNOSIS — M85.89 OSTEOPENIA OF MULTIPLE SITES: ICD-10-CM

## 2022-08-15 DIAGNOSIS — C50.412 MALIGNANT NEOPLASM OF UPPER-OUTER QUADRANT OF LEFT FEMALE BREAST, UNSPECIFIED ESTROGEN RECEPTOR STATUS (HCC): Primary | ICD-10-CM

## 2022-08-15 PROCEDURE — 1124F ACP DISCUSS-NO DSCNMKR DOCD: CPT | Performed by: INTERNAL MEDICINE

## 2022-08-15 PROCEDURE — 99214 OFFICE O/P EST MOD 30 MIN: CPT | Performed by: INTERNAL MEDICINE

## 2022-08-15 RX ORDER — PAROXETINE HYDROCHLORIDE 20 MG/1
TABLET, FILM COATED ORAL
Qty: 90 TABLET | Refills: 1 | Status: SHIPPED | OUTPATIENT
Start: 2022-08-15

## 2022-08-15 RX ORDER — TAMOXIFEN CITRATE 20 MG/1
20 TABLET ORAL DAILY
Qty: 90 TABLET | Refills: 1 | Status: SHIPPED | OUTPATIENT
Start: 2022-08-15

## 2022-08-15 ASSESSMENT — PATIENT HEALTH QUESTIONNAIRE - PHQ9
SUM OF ALL RESPONSES TO PHQ QUESTIONS 1-9: 0
SUM OF ALL RESPONSES TO PHQ9 QUESTIONS 1 & 2: 0
1. LITTLE INTEREST OR PLEASURE IN DOING THINGS: 0
2. FEELING DOWN, DEPRESSED OR HOPELESS: 0
SUM OF ALL RESPONSES TO PHQ QUESTIONS 1-9: 0

## 2022-08-15 NOTE — PROGRESS NOTES
was doing extremely well. She was able to do almost all of her day-to-day activities. Denied any other specific new systemic complaints. March 28, 2019 long discussion with her. We discussed underlying disease process and overall good prognosis. We talked about treatment choices at this stage including changing to a different AI or tamoxifen or even no additional treatment. We felt comfortable trying tamoxifen as she did not have any previous history of clots, smoking etc..    Saw me on March 3, 2020. Stated she has started to experience a few more symptoms of hot flashes couple of weeks ago possibly relating to her stopping Paxil. Emotionally also she has felt somewhat down being off of Paxil. Otherwise physically she was doing about the same fairly active at [employer], still doing certification for 1 nursing programs. Still some chronic back issues for which she had seen Dr. Teressa Stanford negative for any other new systemic complaints still being followed by Dr. 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 Left hand 8/27/0:1. Diffuse osteopenia. 2.  Scattered degenerative changes most pronounced of the first carpometacarpal joint. 3.  Mildly impacted intra-articular comminuted fracture of the distal radius. Left wrist:1. Diffuse osteopenia. 2.  Nondisplaced fracture ulnar styloid process. 3.  Comminuted intra-articular mildly impacted fracture distal radius. 4.  Moderate to severe degenerative changes first carpometacarpal joint. Left forearm:Comminuted mildly impacted distal radius fracture. No other fractures of the radius and ulna. Diffuse osteopenia. 9/2021: Dexa scan with osteopenia.      Dec 2021 mechanical fall    Rodolfo 10 2022 with degenerative disease, spinal stenosis and T11 and L4 compression fracture. Interval History  8/15/2022: Arrived alone to the clinic today. Denies any chest pain, increase shortness of breath, dizziness. No falls. No syncopal episode. No weight loss. No breast mass or axillary lymphadenopathy. No fever. No abdominal pain, nausea, emesis. No headache. Had basal cell cancer resected from the shoulder and also back. Review of Systems   Per interval history; otherwise 10 point ROS is negative              Vital Signs:  /61 (Site: Right Upper Arm, Position: Sitting, Cuff Size: Medium Adult)   Pulse (!) 47   Temp 97.8 °F (36.6 °C) (Infrared)   Ht 4' 10\" (1.473 m)   Wt 163 lb 6.4 oz (74.1 kg)   SpO2 96%   BMI 34.15 kg/m²     Physical Exam:    CONSTITUTIONAL: awake, alert, short statured  EYES: KRISTEN, no palor or any icetrus  ENT: ATNC  NECK: No JVD  HEMATOLOGIC/LYMPHATIC: NO LAD  LUNGS: CTAB  CARDIOVASCULAR: s1s2 bradycardia  ABDOMEN: soft ntnd bs pos  NEUROLOGIC:GI  SKIN: No rash  EXTREMITIES: no LE edema bilaterally  Bilateral mastectomy sites with no overlying skin changes.  No axillary lad bilaterally    Labs:    Hematology:  Lab Results   Component Value Date    WBC 6.0 08/08/2022    RBC 4.10 (L) 08/08/2022    HGB 12.3 (L) 08/08/2022    HCT 37.9 08/08/2022    MCV 92.4 08/08/2022    MCH 30.0 08/08/2022    MCHC 32.5 08/08/2022    RDW 13.7 08/08/2022     08/08/2022    MPV 9.2 08/08/2022    SEGSPCT 53.2 08/08/2022    EOSRELPCT 1.3 08/08/2022    BASOPCT 0.5 08/08/2022    LYMPHOPCT 33.1 08/08/2022    MONOPCT 11.9 (H) 08/08/2022    SEGSABS 3.2 08/08/2022    EOSABS 0.1 08/08/2022    BASOSABS 0.0 08/08/2022    LYMPHSABS 2.0 08/08/2022    MONOSABS 0.7 08/08/2022    DIFFTYPE AUTOMATED DIFFERENTIAL 08/08/2022     No results found for: ESR    Chemistry:  Lab Results   Component Value Date     08/08/2022    K 3.4 (L) 08/08/2022    CL 97 (L) 08/08/2022    CO2 28 08/08/2022    BUN 17 08/08/2022    CREATININE 0.9 08/08/2022    GLUCOSE 98 08/08/2022    CALCIUM 9.0 08/08/2022    PROT 6.8 08/08/2022    LABALBU 4.5 08/08/2022    BILITOT 0.3 08/08/2022    ALKPHOS 44 08/08/2022    AST 20 08/08/2022    ALT 13 08/08/2022    LABGLOM >60 08/08/2022    GFRAA >60 08/08/2022     No results found for: MMA, LDH, HOMOCYSTEINE  No components found for: LD  Lab Results   Component Value Date    TSHHS 1.200 09/20/2021    T4FREE 0.97 09/20/2021    FT3 3.0 08/06/2011       Immunology:  Lab Results   Component Value Date    PROT 6.8 08/08/2022     No results found for: TEODORA DunnR  No results found for: B2M    Coagulation Panel:  Lab Results   Component Value Date    PROTIME 10.4 08/10/2018    INR 0.91 08/10/2018    APTT 30.0 08/10/2018       Anemia Panel:  No results found for: Rolo Correa    Tumor Markers:  Lab Results   Component Value Date    LABCA2 49.9 (H) 08/08/2022         Imaging: Reviewed     Pathology:Reviewed     Observations:  Performance Status: ECOG 0  Depression Status: PHQ-9 Total Score: 0 (8/15/2022  1:48 PM)          Assessment & Plan:  Stage I IBC left: ER/CO positive Her 2 negative. S/p bilateral mastectomies on October 18 2018. Was on letrozole but had significant AE with joint and muscle pains, hence changed to tamoxifen looks like since march 2019. Tolerating fairly well as long as she is on Paxil for hot flashes. Bone density scan in September 2021 with osteopenia. Is on calcium, vitamin D and Prolia. Tamoxifen  planned to continue for total 5 years, until 2024. CA 27-29 in August is 49.9, which is relatively stable. Will continue to follow per NCCN guidelines. Osteopenia/psteoporosis/compression fractures: s/p vertebroplasty. Is on Vitamin D. Is on Prolia. H/O Hypokalemia: Most probably secondary to diuretic. Recommend replacement. Bradycardia.   Seem to be asymptomatic recommend that she holds her beta-blockers until seen by cardiology either today or tomorrow and also discussed  ER evaluation if symptomatic. Onychomycosis, recommend follow-up with podiatry    Discussed healthy lifestyle including healthy diet, regular exercise as tolerate and weight loss if possible. Also discussed the importance of age appropriate screening tools. Colonoscopy due in 2025. No further Pap smears. Discussed the above findings and plan with her and answered all questions    Recommend follow up with PCP and other specialists. Please do not hesitate to contact us if you need any further information.     RTC February 2023 or earlier if new Sx    ALEX

## 2022-08-15 NOTE — PROGRESS NOTES
MA Rooming Questions  Patient: Jessica Posadas  MRN: [id number]    Date: 8/15/2022        1. Do you have any new issues?   no         2. Do you need any refills on medications? yes - Paxil, Tamoxifen    3. Have you had any imaging done since your last visit?   no    4. Have you been hospitalized or seen in the emergency room since your last visit here?   no    5. Did the patient have a depression screening completed today?  Yes    PHQ-9 Total Score: 0 (8/15/2022  1:48 PM)       PHQ-9 Given to (if applicable):               PHQ-9 Score (if applicable):                     [] Positive     [x]  Negative              Does question #9 need addressed (if applicable)                     [] Yes    []  No               Hamzah Gilmore CMA

## 2022-08-19 ENCOUNTER — APPOINTMENT (OUTPATIENT)
Dept: GENERAL RADIOLOGY | Age: 77
DRG: 243 | End: 2022-08-19
Payer: COMMERCIAL

## 2022-08-19 ENCOUNTER — HOSPITAL ENCOUNTER (INPATIENT)
Age: 77
LOS: 4 days | Discharge: HOME | DRG: 243 | End: 2022-08-23
Attending: EMERGENCY MEDICINE | Admitting: STUDENT IN AN ORGANIZED HEALTH CARE EDUCATION/TRAINING PROGRAM
Payer: COMMERCIAL

## 2022-08-19 DIAGNOSIS — R00.1 SYMPTOMATIC BRADYCARDIA: Primary | ICD-10-CM

## 2022-08-19 PROBLEM — N17.9 AKI (ACUTE KIDNEY INJURY) (HCC): Status: ACTIVE | Noted: 2022-08-19

## 2022-08-19 LAB
ALBUMIN SERPL-MCNC: 4.6 GM/DL (ref 3.4–5)
ALP BLD-CCNC: 49 IU/L (ref 40–129)
ALT SERPL-CCNC: 14 U/L (ref 10–40)
ANION GAP SERPL CALCULATED.3IONS-SCNC: 15 MMOL/L (ref 4–16)
APTT: 31.9 SECONDS (ref 25.1–37.1)
AST SERPL-CCNC: 20 IU/L (ref 15–37)
BACTERIA: NEGATIVE /HPF
BASOPHILS ABSOLUTE: 0.1 K/CU MM
BASOPHILS RELATIVE PERCENT: 0.6 % (ref 0–1)
BILIRUB SERPL-MCNC: 0.2 MG/DL (ref 0–1)
BILIRUBIN URINE: NEGATIVE MG/DL
BLOOD, URINE: ABNORMAL
BUN BLDV-MCNC: 27 MG/DL (ref 6–23)
CALCIUM SERPL-MCNC: 9.1 MG/DL (ref 8.3–10.6)
CHLORIDE BLD-SCNC: 100 MMOL/L (ref 99–110)
CLARITY: CLEAR
CO2: 22 MMOL/L (ref 21–32)
COLOR: YELLOW
CREAT SERPL-MCNC: 1 MG/DL (ref 0.6–1.1)
DIFFERENTIAL TYPE: ABNORMAL
EKG ATRIAL RATE: 45 BPM
EKG DIAGNOSIS: NORMAL
EKG P AXIS: 65 DEGREES
EKG P-R INTERVAL: 196 MS
EKG Q-T INTERVAL: 596 MS
EKG QRS DURATION: 122 MS
EKG QTC CALCULATION (BAZETT): 515 MS
EKG R AXIS: -46 DEGREES
EKG T AXIS: 56 DEGREES
EKG VENTRICULAR RATE: 45 BPM
EOSINOPHILS ABSOLUTE: 0.1 K/CU MM
EOSINOPHILS RELATIVE PERCENT: 1.1 % (ref 0–3)
GFR AFRICAN AMERICAN: >60 ML/MIN/1.73M2
GFR NON-AFRICAN AMERICAN: 54 ML/MIN/1.73M2
GLUCOSE BLD-MCNC: 111 MG/DL (ref 70–99)
GLUCOSE, URINE: NEGATIVE MG/DL
HCT VFR BLD CALC: 38.6 % (ref 37–47)
HEMOGLOBIN: 12.6 GM/DL (ref 12.5–16)
HYALINE CASTS: 0 /LPF
IMMATURE NEUTROPHIL %: 0.2 % (ref 0–0.43)
INR BLD: 0.86 INDEX
KETONES, URINE: NEGATIVE MG/DL
LEUKOCYTE ESTERASE, URINE: NEGATIVE
LV EF: 53 %
LVEF MODALITY: NORMAL
LYMPHOCYTES ABSOLUTE: 2.8 K/CU MM
LYMPHOCYTES RELATIVE PERCENT: 33.8 % (ref 24–44)
MCH RBC QN AUTO: 30.2 PG (ref 27–31)
MCHC RBC AUTO-ENTMCNC: 32.6 % (ref 32–36)
MCV RBC AUTO: 92.6 FL (ref 78–100)
MONOCYTES ABSOLUTE: 0.9 K/CU MM
MONOCYTES RELATIVE PERCENT: 10.6 % (ref 0–4)
MUCUS: ABNORMAL HPF
NITRITE URINE, QUANTITATIVE: NEGATIVE
NUCLEATED RBC %: 0 %
PDW BLD-RTO: 13.2 % (ref 11.7–14.9)
PH, URINE: 6 (ref 5–8)
PLATELET # BLD: 268 K/CU MM (ref 140–440)
PMV BLD AUTO: 9.7 FL (ref 7.5–11.1)
POTASSIUM SERPL-SCNC: 4.1 MMOL/L (ref 3.5–5.1)
PROTEIN UA: ABNORMAL MG/DL
PROTHROMBIN TIME: 11.1 SECONDS (ref 11.7–14.5)
RBC # BLD: 4.17 M/CU MM (ref 4.2–5.4)
RBC URINE: 8 /HPF (ref 0–6)
SEGMENTED NEUTROPHILS ABSOLUTE COUNT: 4.5 K/CU MM
SEGMENTED NEUTROPHILS RELATIVE PERCENT: 53.7 % (ref 36–66)
SODIUM BLD-SCNC: 137 MMOL/L (ref 135–145)
SPECIFIC GRAVITY UA: 1.01 (ref 1–1.03)
SQUAMOUS EPITHELIAL: <1 /HPF
TOTAL IMMATURE NEUTOROPHIL: 0.02 K/CU MM
TOTAL NUCLEATED RBC: 0 K/CU MM
TOTAL PROTEIN: 7.4 GM/DL (ref 6.4–8.2)
TRICHOMONAS: ABNORMAL /HPF
TSH HIGH SENSITIVITY: 2.27 UIU/ML (ref 0.27–4.2)
UROBILINOGEN, URINE: 0.2 MG/DL (ref 0.2–1)
WBC # BLD: 8.4 K/CU MM (ref 4–10.5)
WBC UA: ABNORMAL /HPF (ref 0–5)
YEAST: ABNORMAL /HPF

## 2022-08-19 PROCEDURE — 80053 COMPREHEN METABOLIC PANEL: CPT

## 2022-08-19 PROCEDURE — 6360000002 HC RX W HCPCS: Performed by: EMERGENCY MEDICINE

## 2022-08-19 PROCEDURE — 81001 URINALYSIS AUTO W/SCOPE: CPT

## 2022-08-19 PROCEDURE — 96374 THER/PROPH/DIAG INJ IV PUSH: CPT

## 2022-08-19 PROCEDURE — 85610 PROTHROMBIN TIME: CPT

## 2022-08-19 PROCEDURE — 96361 HYDRATE IV INFUSION ADD-ON: CPT

## 2022-08-19 PROCEDURE — 93306 TTE W/DOPPLER COMPLETE: CPT

## 2022-08-19 PROCEDURE — 96375 TX/PRO/DX INJ NEW DRUG ADDON: CPT

## 2022-08-19 PROCEDURE — 99223 1ST HOSP IP/OBS HIGH 75: CPT | Performed by: INTERNAL MEDICINE

## 2022-08-19 PROCEDURE — 6360000002 HC RX W HCPCS: Performed by: INTERNAL MEDICINE

## 2022-08-19 PROCEDURE — C1769 GUIDE WIRE: HCPCS

## 2022-08-19 PROCEDURE — 2500000003 HC RX 250 WO HCPCS: Performed by: EMERGENCY MEDICINE

## 2022-08-19 PROCEDURE — 2580000003 HC RX 258: Performed by: INTERNAL MEDICINE

## 2022-08-19 PROCEDURE — 2000000000 HC ICU R&B

## 2022-08-19 PROCEDURE — 85025 COMPLETE CBC W/AUTO DIFF WBC: CPT

## 2022-08-19 PROCEDURE — 99285 EMERGENCY DEPT VISIT HI MDM: CPT

## 2022-08-19 PROCEDURE — 94761 N-INVAS EAR/PLS OXIMETRY MLT: CPT

## 2022-08-19 PROCEDURE — APPNB60 APP NON BILLABLE TIME 46-60 MINS: Performed by: NURSE PRACTITIONER

## 2022-08-19 PROCEDURE — 6370000000 HC RX 637 (ALT 250 FOR IP): Performed by: STUDENT IN AN ORGANIZED HEALTH CARE EDUCATION/TRAINING PROGRAM

## 2022-08-19 PROCEDURE — 71045 X-RAY EXAM CHEST 1 VIEW: CPT

## 2022-08-19 PROCEDURE — 93010 ELECTROCARDIOGRAM REPORT: CPT | Performed by: INTERNAL MEDICINE

## 2022-08-19 PROCEDURE — 85730 THROMBOPLASTIN TIME PARTIAL: CPT

## 2022-08-19 PROCEDURE — 84436 ASSAY OF TOTAL THYROXINE: CPT

## 2022-08-19 PROCEDURE — 93005 ELECTROCARDIOGRAM TRACING: CPT | Performed by: PHYSICIAN ASSISTANT

## 2022-08-19 PROCEDURE — 84443 ASSAY THYROID STIM HORMONE: CPT

## 2022-08-19 RX ORDER — PANTOPRAZOLE SODIUM 40 MG/1
40 TABLET, DELAYED RELEASE ORAL DAILY
Status: DISCONTINUED | OUTPATIENT
Start: 2022-08-19 | End: 2022-08-23 | Stop reason: HOSPADM

## 2022-08-19 RX ORDER — EZETIMIBE 10 MG/1
10 TABLET ORAL DAILY
Status: DISCONTINUED | OUTPATIENT
Start: 2022-08-19 | End: 2022-08-20

## 2022-08-19 RX ORDER — ONDANSETRON 4 MG/1
4 TABLET, ORALLY DISINTEGRATING ORAL EVERY 8 HOURS PRN
Status: DISCONTINUED | OUTPATIENT
Start: 2022-08-19 | End: 2022-08-23 | Stop reason: HOSPADM

## 2022-08-19 RX ORDER — PAROXETINE HYDROCHLORIDE 20 MG/1
20 TABLET, FILM COATED ORAL DAILY
Status: DISCONTINUED | OUTPATIENT
Start: 2022-08-19 | End: 2022-08-20

## 2022-08-19 RX ORDER — ACETAMINOPHEN 325 MG/1
650 TABLET ORAL EVERY 6 HOURS PRN
Status: DISCONTINUED | OUTPATIENT
Start: 2022-08-19 | End: 2022-08-23 | Stop reason: HOSPADM

## 2022-08-19 RX ORDER — ONDANSETRON 2 MG/ML
4 INJECTION INTRAMUSCULAR; INTRAVENOUS EVERY 6 HOURS PRN
Status: DISCONTINUED | OUTPATIENT
Start: 2022-08-19 | End: 2022-08-19 | Stop reason: ALTCHOICE

## 2022-08-19 RX ORDER — HYDRALAZINE HYDROCHLORIDE 20 MG/ML
10 INJECTION INTRAMUSCULAR; INTRAVENOUS EVERY 4 HOURS PRN
Status: DISCONTINUED | OUTPATIENT
Start: 2022-08-19 | End: 2022-08-23 | Stop reason: HOSPADM

## 2022-08-19 RX ORDER — SODIUM CHLORIDE 9 MG/ML
INJECTION, SOLUTION INTRAVENOUS PRN
Status: DISCONTINUED | OUTPATIENT
Start: 2022-08-19 | End: 2022-08-23 | Stop reason: HOSPADM

## 2022-08-19 RX ORDER — ONDANSETRON 2 MG/ML
4 INJECTION INTRAMUSCULAR; INTRAVENOUS EVERY 30 MIN PRN
Status: DISCONTINUED | OUTPATIENT
Start: 2022-08-19 | End: 2022-08-23 | Stop reason: HOSPADM

## 2022-08-19 RX ORDER — ACETAMINOPHEN 650 MG/1
650 SUPPOSITORY RECTAL EVERY 6 HOURS PRN
Status: DISCONTINUED | OUTPATIENT
Start: 2022-08-19 | End: 2022-08-23 | Stop reason: HOSPADM

## 2022-08-19 RX ORDER — SODIUM CHLORIDE 9 MG/ML
INJECTION, SOLUTION INTRAVENOUS CONTINUOUS
Status: DISCONTINUED | OUTPATIENT
Start: 2022-08-19 | End: 2022-08-22

## 2022-08-19 RX ORDER — SODIUM CHLORIDE 0.9 % (FLUSH) 0.9 %
5-40 SYRINGE (ML) INJECTION EVERY 12 HOURS SCHEDULED
Status: DISCONTINUED | OUTPATIENT
Start: 2022-08-19 | End: 2022-08-23 | Stop reason: HOSPADM

## 2022-08-19 RX ORDER — ENOXAPARIN SODIUM 100 MG/ML
40 INJECTION SUBCUTANEOUS DAILY
Status: DISCONTINUED | OUTPATIENT
Start: 2022-08-19 | End: 2022-08-23 | Stop reason: HOSPADM

## 2022-08-19 RX ORDER — SODIUM CHLORIDE 0.9 % (FLUSH) 0.9 %
5-40 SYRINGE (ML) INJECTION PRN
Status: DISCONTINUED | OUTPATIENT
Start: 2022-08-19 | End: 2022-08-23 | Stop reason: HOSPADM

## 2022-08-19 RX ADMIN — PAROXETINE HYDROCHLORIDE 20 MG: 20 TABLET, FILM COATED ORAL at 22:29

## 2022-08-19 RX ADMIN — DOBUTAMINE 5 MCG/KG/MIN: 12.5 INJECTION, SOLUTION, CONCENTRATE INTRAVENOUS at 12:32

## 2022-08-19 RX ADMIN — DOBUTAMINE 5 MCG/KG/MIN: 12.5 INJECTION, SOLUTION, CONCENTRATE INTRAVENOUS at 20:35

## 2022-08-19 RX ADMIN — ONDANSETRON 4 MG: 2 INJECTION INTRAMUSCULAR; INTRAVENOUS at 11:24

## 2022-08-19 RX ADMIN — SODIUM CHLORIDE: 9 INJECTION, SOLUTION INTRAVENOUS at 11:42

## 2022-08-19 RX ADMIN — ACETAMINOPHEN 650 MG: 325 TABLET ORAL at 23:53

## 2022-08-19 RX ADMIN — GLUCAGON HYDROCHLORIDE 1 MG: KIT at 11:25

## 2022-08-19 RX ADMIN — EZETIMIBE 10 MG: 10 TABLET ORAL at 22:29

## 2022-08-19 ASSESSMENT — ENCOUNTER SYMPTOMS
EYE PAIN: 0
COUGH: 0
EYE DISCHARGE: 0
SINUS PRESSURE: 0
RHINORRHEA: 0
DIARRHEA: 0
VOMITING: 0
SHORTNESS OF BREATH: 0
ABDOMINAL PAIN: 0
BLOOD IN STOOL: 0
CHEST TIGHTNESS: 0
SINUS PAIN: 0
NAUSEA: 0
VOICE CHANGE: 0
BACK PAIN: 0

## 2022-08-19 ASSESSMENT — PAIN SCALES - GENERAL
PAINLEVEL_OUTOF10: 0
PAINLEVEL_OUTOF10: 6
PAINLEVEL_OUTOF10: 0
PAINLEVEL_OUTOF10: 0

## 2022-08-19 ASSESSMENT — PAIN - FUNCTIONAL ASSESSMENT: PAIN_FUNCTIONAL_ASSESSMENT: 0-10

## 2022-08-19 ASSESSMENT — PAIN DESCRIPTION - LOCATION: LOCATION: HEAD

## 2022-08-19 NOTE — ED PROVIDER NOTES
Supervisory note. Patient was initially seen by the physician assistant. Please see his documentation for his full evaluation, assessment, and plan. I evaluated this patient independently. This is a 59-year-old woman who comes the emergency department with chest pain. She has been complaining of generalized weakness and fatigue. She describes generalized dizziness but no vertigo symptoms. On presentation here in the emergency department. She was found to be bradycardic with a heart rate in the 40s. Physical exam:  Neuro: Awake, responsive, oriented  Chest: Clear to auscultation bilaterally. Bradycardic    ECG:  Sinus bradycardia. Ventricular rate of 45. DE is 196. QRS is 122. QTc is 515. There are no abnormal ST elevations or depressions. There is a prolonged QTC as well as a prolonged QRS. Patient was evaluated at bedside along with her cardiologist.  Cardiac monitor indicates that the patient may have episodic/paroxysmal third-degree heart block.     Patient will be admitted to the hospital for further evaluation by medicine and cardiology      Clinical impression:  Symptomatic bradycardia  Chest pain     Camille Quinteros MD  08/19/22 [id number]

## 2022-08-19 NOTE — H&P
V2.0  History and Physical      Name:  Shantell Steen /Age/Sex: 1945  (64 y.o. female)   MRN & CSN:  [id number] & [id number] Encounter Date/Time: 2022 7:18 PM EDT   Location:  -A PCP: Tracee Perera MD       Hospital Day: 1    Assessment and Plan:   Shantell Steen is a 64 y.o. female with a PMHx of  hyperlipidemia Breast cancer s/p mastectomies and hypertension who presents with Symptomatic bradycardia    Hospital Problems             Last Modified POA    * (Principal) Symptomatic bradycardia 2022 Yes    GEMMA (acute kidney injury) ([facility] [address] 2022 Yes     *High grade symptomatic Bradycardia (2:1 AV block)  *Chronic Bifasicular block  Discontinue BB, last dose was 0830 today  Monitor in ICU on telemetry  EP and cardiology following, low threshold for pacemaker    *GEMMA  -Likely prerenal  IV fluid given in ER    Other chronic medical conditions include:     Hypertension: can utilize hydralazine prn for SBP >160  Breast cancer s/p mastectomy    Disposition:   Current Living situation: Home  Expected Disposition: Home  Estimated D/C: 2-3 days    Diet ADULT DIET; Regular   DVT Prophylaxis [x] Lovenox, []  Heparin, [] SCDs, [] Ambulation,  [] Eliquis, [] Xarelto   Code Status Prior   Surrogate Decision Maker/ [place] Sep, sister     History from:     patient    History of Present Illness:     Chief Complaint: Symptomatic bradycardia  Shantell Steen is a 64 y.o. female with pmh of breast cancer s/p mastectomy and hypertension who was sent by cardiologist due to HR in 45s. Upon arrival in ER her HR was around 38. Patient states that she was initially on Toprol XL 25mg BID but she cut down to daily dosing after her HR would stay low. She reports palpitations and dizziness on/off. Patient denies headaches, syncopal episode. Review of Systems: Need 10 Elements   Review of Systems   Constitutional:  Negative for appetite change, chills, fever and unexpected weight change.    HENT:  Negative for ear pain, hearing loss, sinus pressure, sinus pain and voice change. Eyes:  Negative for pain, discharge and visual disturbance. Respiratory:  Negative for cough, chest tightness and shortness of breath. Cardiovascular:  Positive for palpitations. Negative for chest pain and leg swelling. Gastrointestinal:  Negative for abdominal pain, blood in stool, diarrhea, nausea and vomiting. Genitourinary:  Negative for dysuria and frequency. Musculoskeletal:  Negative for arthralgias and back pain. Neurological:  Negative for dizziness, weakness, light-headedness and headaches. Psychiatric/Behavioral:  Negative for sleep disturbance. Objective:   No intake or output data in the 24 hours ending 08/19/22 2022   Vitals:   Vitals:    08/19/22 1915 08/19/22 1930 08/19/22 1945 08/19/22 2003   BP: (!) 128/44 85/60 (!) 126/56 (!) 154/112   Pulse: (!) 43 (!) 43 (!) 42 (!) 44   Resp: 15 16 14 21   Temp:    98.1 °F (36.7 °C)   TempSrc:    Oral   SpO2: 95% 95% 97% 97%   Weight:       Height:           Medications Prior to Admission     Prior to Admission medications    Medication Sig Start Date End Date Taking? Authorizing Provider   Calcium Carbonate-Vit D-Min (CALCIUM 1200 PO) Take 1 tablet by mouth 2 times daily    Historical Provider, MD   PARoxetine (PAXIL) 20 MG tablet TAKE 1 TABLET BY MOUTH EVERY DAY 8/15/22   Donnie Joiner MD   tamoxifen (NOLVADEX) 20 MG tablet Take 1 tablet by mouth in the morning. 8/15/22   Donnie Joiner MD   Denosumab (PROLIA SC) Inject into the skin    Historical Provider, MD   potassium chloride (MICRO-K) 10 MEQ extended release capsule 10 mEq daily 6/8/20   Historical Provider, MD   montelukast (SINGULAIR) 10 MG tablet 10 mg nightly 11/24/18   Historical Provider, MD   gabapentin (NEURONTIN) 600 MG tablet Take 600 mg by mouth 3 times daily. Ernestina Farris     Historical Provider, MD   triamterene-hydroCHLOROthiazide (DYAZIDE) 37.5-25 MG per capsule Take 25 mg by mouth daily    Historical Provider, MD vitamin D (CHOLECALCIFEROL) 1000 UNIT TABS tablet Take 1,000 Units by mouth daily    Historical Provider, MD   esomeprazole Magnesium (NEXIUM) 20 MG PACK Take 20 mg by mouth daily    Historical Provider, MD   ezetimibe (ZETIA) 10 MG tablet Take 10 mg by mouth daily    Historical Provider, MD   metoprolol tartrate (LOPRESSOR) 25 MG tablet Take 25 mg by mouth once  8/19/22  Historical Provider, MD       Physical Exam: Need 8 Elements   Physical Exam  Vitals reviewed. Constitutional:       Appearance: Normal appearance. She is normal weight. HENT:      Head: Normocephalic. Nose: Nose normal.      Mouth/Throat:      Mouth: Mucous membranes are moist.   Eyes:      Conjunctiva/sclera: Conjunctivae normal.      Pupils: Pupils are equal, round, and reactive to light. Cardiovascular:      Rate and Rhythm: Bradycardia present. Pulses: Normal pulses. Heart sounds: Normal heart sounds. No murmur heard. Pulmonary:      Effort: Pulmonary effort is normal.      Breath sounds: Normal breath sounds. No wheezing, rhonchi or rales. Abdominal:      General: Abdomen is flat. Bowel sounds are normal. There is no distension. Palpations: Abdomen is soft. Tenderness: There is no abdominal tenderness. Musculoskeletal:         General: No deformity. Normal range of motion. Cervical back: Normal range of motion and neck supple. Right lower leg: No edema. Left lower leg: No edema. Skin:     Coloration: Skin is not jaundiced or pale. Neurological:      General: No focal deficit present. Mental Status: She is alert and oriented to person, place, and time. Mental status is at baseline. Past History:   PMHx   Past Medical History:   Diagnosis Date    Arthritis     CAD (coronary artery disease)     Cancer ([facility] [address]     breast, treated with radiation     Hyperlipidemia     Hypertension     Osteoporosis    PSHX:  has a past surgical history that includes back surgery;  Hysterectomy; Cholecystectomy; Breast surgery (Left); and Mastectomy, bilateral (Bilateral, 10/19/2018). Fam HX: family history includes COPD in her father and mother; High Blood Pressure in her father, mother, and sister. Soc HX:   Social History     Socioeconomic History    Marital status:      Spouse name: None    Number of children: None    Years of education: None    Highest education level: None   Tobacco Use    Smoking status: Never    Smokeless tobacco: Never   Vaping Use    Vaping Use: Never used   Substance and Sexual Activity    Alcohol use: No    Drug use: No       Allergies: Allergies: Allergies   Allergen Reactions    Cefaclor Shortness Of Breath    Ciprofloxacin Itching    Corn Oil Anaphylaxis       Medications:   Medications:    sodium chloride flush  5-40 mL IntraVENous 2 times per day    enoxaparin  40 mg SubCUTAneous Daily    esomeprazole Magnesium  20 mg Oral Daily    ezetimibe  10 mg Oral Daily    PARoxetine  1 tablet Oral Daily      Infusions:    sodium chloride 75 mL/hr at 08/19/22 1142    DOBUTamine Stopped (08/19/22 1320)    sodium chloride       PRN Meds: ondansetron, 4 mg, Q30 Min PRN  sodium chloride flush, 5-40 mL, PRN  sodium chloride, , PRN  ondansetron, 4 mg, Q8H PRN  acetaminophen, 650 mg, Q6H PRN   Or  acetaminophen, 650 mg, Q6H PRN      Labs      CBC:   Recent Labs     08/19/22  1019   WBC 8.4   HGB 12.6        BMP:    Recent Labs     08/19/22  1019      K 4.1      CO2 22   BUN 27*   CREATININE 1.0   GLUCOSE 111*     Hepatic:   Recent Labs     08/19/22  1019   AST 20   ALT 14   BILITOT 0.2   ALKPHOS 49     Lipids:   Lab Results   Component Value Date/Time    CHOL 183 09/20/2021 10:59 AM    HDL 52 09/20/2021 10:59 AM    TRIG 212 09/20/2021 10:59 AM     Hemoglobin A1C: No results found for: LABA1C  TSH: No results found for: TSH  Troponin: No results found for: TROPONINT  Lactic Acid: No results for input(s): LACTA in the last 72 hours.   BNP: No results for input(s): PROBNP in the last 72 hours. UA:  Lab Results   Component Value Date/Time    NITRU NEGATIVE 08/19/2022 12:01 PM    COLORU YELLOW 08/19/2022 12:01 PM    WBCUA NONE SEEN 08/19/2022 12:01 PM    RBCUA 8 08/19/2022 12:01 PM    MUCUS RARE 08/19/2022 12:01 PM    TRICHOMONAS NONE SEEN 08/19/2022 12:01 PM    YEAST RARE 08/19/2022 12:01 PM    BACTERIA NEGATIVE 08/19/2022 12:01 PM    CLARITYU CLEAR 08/19/2022 12:01 PM    SPECGRAV 1.015 08/19/2022 12:01 PM    LEUKOCYTESUR NEGATIVE 08/19/2022 12:01 PM    UROBILINOGEN 0.2 08/19/2022 12:01 PM    BILIRUBINUR NEGATIVE 08/19/2022 12:01 PM    BLOODU MODERATE 08/19/2022 12:01 PM    KETUA NEGATIVE 08/19/2022 12:01 PM     Urine Cultures: No results found for: LABURIN  Blood Cultures: No results found for: BC  No results found for: BLOODCULT2  Organism: No results found for: ORG    Imaging/Diagnostics Last 24 Hours   Echocardiogram complete 2D with doppler with color    Result Date: 8/19/2022  Transthoracic Echocardiography Report (TTE)  Demographics   Patient Name       Avis Lobato A    Date of Study       08/19/2022   Date of Birth      1945        Gender              Female   Age                64 year(s)        Race                   Patient Number     [id number]        Room Number         ED27   Visit Number       [id number]   Corporate ID       [id number]   Accession Number   [id number]        Sonographer         ANIL Merchant RVT   Ordering Physician Ramon Bernstein MD  Interpreting        Ramon Bernstein MD                                       Physician  Procedure Type of Study   TTE procedure:ECHOCARDIOGRAM COMPLETE 2D W DOPPLER W COLOR.   Procedure Date Date: 08/19/2022 Start: 11:18 AM Study Location: Portable Technical Quality: Adequate visualization Indications:Chest pain. Patient Status: Routine Height: 57 inches Weight: 163 pounds BSA: 1.65 m2 BMI: 35.27 kg/m2 HR: 42 bpm BP: 163/60 mmHg  Conclusions   Summary  Left ventricular systolic function is normal.  Ejection fraction is visually estimated at 50-55%. No significant valvular disease noted. No evidence of any pericardial effusion. Signature   ------------------------------------------------------------------  Electronically signed by Ryne Hester MD (Interpreting  physician) on 08/19/2022 at 11:52 AM  ------------------------------------------------------------------   Findings   Left Ventricle  Left ventricular systolic function is normal.  Ejection fraction is visually estimated at 50-55%. No regional wall motion abnormalites. Left ventricle size is normal.  Normal diastolic function. Left Atrium  Essentially normal left atrium. Right Atrium  Essentially normal right atrium. Right Ventricle  Essentially normal right ventricle. Aortic Valve  Structurally normal aortic valve. Mitral Valve  Mild mitral regurgitation. Tricuspid Valve  No evidence of tricuspid regurgitation. Pulmonic Valve  The pulmonic valve was not well visualized. Pericardial Effusion  No evidence of any pericardial effusion. Pleural Effusion  No evidence of pleural effusion. Miscellaneous  IVC and abdominal aorta are within normal limits.   M-Mode/2D Measurements & Calculations   LV Diastolic Dimension:  LV Systolic Dimension:  LA Dimension: 3.5 cmAO Root  4.54 cm                  2.77 cm                 Dimension: 2.8 cmLA Area:  LV FS:39 %               LV Volume Diastolic: 66 07.5 cm2  LV PW Diastolic: 9.41 cm ml  LV PW Systolic: 6.17 cm  LV Volume Systolic: 29  Septum Diastolic: 7.67   ml  cm                       LV EDV/LV EDV Index: 66 RV Diastolic Dimension:  Septum Systolic: 9.43 cm XX/03 Z1UW ESV/LV ESV   2.74 cm  CO: 3.19 l/min           Index: 29 ml/18 m2  CI: 1.93 l/m*m2          EF Calculated (A4C): LA/Aorta: 1.25                           56.1 %  LV Area Diastolic: 87.7  EF Calculated (2D):     LA volume/Index: 46 ml  cm2                      69.5 %                  /27N5  LV Area Systolic: 15 cm2                           LV Length: 7.46 cm                            LVOT: 1.9 cm  Doppler Measurements & Calculations   MV Peak E-Wave: 137     AV Peak Velocity: 126 cm/s   LVOT Peak Velocity: 114  cm/s                    AV Peak Gradient: 6.35 mmHg  cm/s  MV Peak A-Wave: 103     AV Mean Velocity: 85.3 cm/s  LVOT Mean Velocity:  cm/s                    AV Mean Gradient: 3 mmHg     78.6 cm/s  MV E/A Ratio: 1.33      AV VTI: 29.1 cm              LVOT Peak Gradient: 5  MV Peak Gradient: 7.51  AV Area (Continuity):2.61    mmHgLVOT Mean Gradient:  mmHg                    cm2                          3 mmHg   MV P1/2t: 48 msec       LVOT VTI: 26.8 cm  MVA by PHT:4.58 cm2   MV E' Septal Velocity:  14.9 cm/s  MV E' Lateral Velocity:  12.6 cm/s  MV E/E' septal: 9.19  MV E/E' lateral: 10.87      XR CHEST PORTABLE    Result Date: 8/19/2022  EXAMINATION: ONE XRAY VIEW OF THE CHEST 8/19/2022 11:06 am COMPARISON: 10/15/2018 HISTORY: ORDERING SYSTEM PROVIDED HISTORY: dizziness TECHNOLOGIST PROVIDED HISTORY: Reason for exam:->dizziness Reason for Exam: dizziness FINDINGS: The lungs are without acute focal process. There is no effusion or pneumothorax. The cardiomediastinal silhouette is without acute process. The osseous structures are without acute process. No acute process.        Personally reviewed Lab Studies, Imaging, and discussed case with Dr. Barbara Shafer (Cardiology)    Electronically signed by Yoanna Peterson MD on 8/19/2022 at 8:22 PM

## 2022-08-19 NOTE — CARE COORDINATION
Met with patient. She is alert and able to participate. Patient is from home alone. Very independent. Has a PCP and insurance that assist with Rx when needed. CM will fiollow for needs. Breana Brown RN

## 2022-08-19 NOTE — ED PROVIDER NOTES
[facility] ICU  EMERGENCY DEPARTMENT ENCOUNTER        Pt Name: Cinthya Bishop  MRN: [id number]  [place] 1945  Date of evaluation: 8/19/2022  Provider: Reji Rios PA-C  PCP: Niru Bess MD  Note Started: 10:48 AM EDT      TED. I have evaluated this patient. My supervising physician was available for consultation. Triage CHIEF COMPLAINT       Chief Complaint   Patient presents with    Bradycardia     HR in 45s; sent from [facility]iology office         HISTORY OF PRESENT ILLNESS   (Location/Symptom, Timing/Onset, Context/Setting, Quality, Duration, Modifying Factors, Severity)  Note limiting factors. Chief Complaint: lightheadedness    Cinthya Bishop is a 64 y.o. female who presents via private vehicle from her cardiologist office. She mentions that she has been feeling lightheadedness. She had discussed some medication changes with her metoprolol with her primary care provider who had advised that she also follow-up with cardiology. Patient had an appointment with that office today and was advised to be seen in the emergency department. She denies any recent URI symptoms, fevers, headaches, chest pain, shortness of breath. Nursing Notes were all reviewed and agreed with or any disagreements were addressed in the HPI. REVIEW OF SYSTEMS    (2-9 systems for level 4, 10 or more for level 5)     Review of Systems   Constitutional:  Negative for chills and fever. HENT:  Negative for congestion and rhinorrhea. Eyes:  Negative for pain and visual disturbance. Respiratory:  Negative for cough and shortness of breath. Cardiovascular:  Negative for chest pain and leg swelling. Gastrointestinal:  Negative for abdominal pain, diarrhea, nausea and vomiting. Genitourinary:  Negative for dysuria and hematuria. Musculoskeletal:  Negative for back pain and myalgias. Skin:  Negative for rash and wound. Neurological:  Positive for light-headedness.  Negative for dizziness, syncope and speech difficulty. PAST MEDICAL HISTORY     Past Medical History:   Diagnosis Date    Arthritis     CAD (coronary artery disease)     Cancer ([facility] [address]     breast, treated with radiation     Hyperlipidemia     Hypertension        SURGICAL HISTORY     Past Surgical History:   Procedure Laterality Date    BACK SURGERY      BREAST SURGERY Left     lesion removed     CHOLECYSTECTOMY      HYSTERECTOMY (CERVIX STATUS UNKNOWN)      MASTECTOMY, BILATERAL Bilateral 10/19/2018       CURRENTMEDICATIONS       Current Discharge Medication List        CONTINUE these medications which have NOT CHANGED    Details   Calcium Carbonate-Vit D-Min (CALCIUM 1200 PO) Take 1 tablet by mouth 2 times daily      PARoxetine (PAXIL) 20 MG tablet TAKE 1 TABLET BY MOUTH EVERY DAY  Qty: 90 tablet, Refills: 1      tamoxifen (NOLVADEX) 20 MG tablet Take 1 tablet by mouth in the morning. Qty: 90 tablet, Refills: 1      Denosumab (PROLIA SC) Inject into the skin      potassium chloride (MICRO-K) 10 MEQ extended release capsule 10 mEq daily      montelukast (SINGULAIR) 10 MG tablet 10 mg nightly      gabapentin (NEURONTIN) 600 MG tablet Take 600 mg by mouth 3 times daily. Sherryle Moder triamterene-hydroCHLOROthiazide (DYAZIDE) 37.5-25 MG per capsule Take 25 mg by mouth daily      vitamin D (CHOLECALCIFEROL) 1000 UNIT TABS tablet Take 1,000 Units by mouth daily      esomeprazole Magnesium (NEXIUM) 20 MG PACK Take 20 mg by mouth daily      ezetimibe (ZETIA) 10 MG tablet Take 10 mg by mouth daily             ALLERGIES     Cefaclor, Ciprofloxacin, and Corn oil    FAMILYHISTORY       Family History   Problem Relation Age of Onset    High Blood Pressure Mother     COPD Mother     High Blood Pressure Father     COPD Father     High Blood Pressure Sister         SOCIAL HISTORY       Social History     Socioeconomic History    Marital status:       Spouse name: None    Number of children: None    Years of education: None    Highest education level: None   Tobacco Use    Smoking status: Never    Smokeless tobacco: Never   Vaping Use    Vaping Use: Never used   Substance and Sexual Activity    Alcohol use: No    Drug use: No       SCREENINGS    [place] Coma Scale  Eye Opening: Spontaneous  Best Verbal Response: Oriented  Best Motor Response: Obeys commands  [place] Coma Scale Score: 15        PHYSICAL EXAM    (up to 7 for level 4, 8 or more for level 5)     ED Triage Vitals   BP Temp Temp src Pulse Resp SpO2 Height Weight   -- -- -- -- -- -- -- --       Physical Exam  Vitals and nursing note reviewed. Constitutional:       Appearance: Normal appearance. She is well-developed. She is not ill-appearing or diaphoretic. HENT:      Head: Normocephalic and atraumatic. Right Ear: External ear normal.      Left Ear: External ear normal.      Nose: Nose normal.   Eyes:      General:         Right eye: No discharge. Left eye: No discharge. Pulmonary:      Effort: Pulmonary effort is normal. No respiratory distress. Breath sounds: No stridor. Musculoskeletal:         General: Normal range of motion. Cervical back: Normal range of motion and neck supple. Skin:     General: Skin is warm and dry. Coloration: Skin is not pale. Neurological:      General: No focal deficit present. Mental Status: She is alert and oriented to person, place, and time.    Psychiatric:         Mood and Affect: Mood normal.         Behavior: Behavior normal.       DIAGNOSTIC RESULTS   LABS:    Labs Reviewed   CBC WITH AUTO DIFFERENTIAL - Abnormal; Notable for the following components:       Result Value    RBC 4.17 (*)     Monocytes % 10.6 (*)     All other components within normal limits   COMPREHENSIVE METABOLIC PANEL W/ REFLEX TO MG FOR LOW K - Abnormal; Notable for the following components:    BUN 27 (*)     Glucose 111 (*)     GFR Non- 54 (*)     All other components within normal limits   PROTIME/INR & PTT - Abnormal; Notable for the following components:    Protime 11.1 (*)     All other components within normal limits   URINALYSIS WITH MICROSCOPIC - Abnormal; Notable for the following components:    Blood, Urine MODERATE (*)     Protein, UA TRACE (*)     RBC, UA 8 (*)     Mucus, UA RARE (*)     All other components within normal limits   TSH   T4       When ordered, only abnormal lab results are displayed. All other labs were within normal range or not returned as of this dictation. EKG: When ordered, EKG's are interpreted by the Emergency Department Physician in the absence of a cardiologist.  Please see their note for interpretation of EKG. RADIOLOGY:   Non-plain film images such as CT, Ultrasound and MRI are read by the radiologist. Plain radiographic images are visualized andpreliminarily interpreted by the  ED Provider with the below findings:        Interpretation perthe Radiologist below, if available at the time of this note:    XR CHEST PORTABLE   Final Result   No acute process. No results found.       PROCEDURES   Unless otherwise noted below, none     Procedures    CRITICAL CARE TIME   N/A    CONSULTS:  IP CONSULT TO CARDIOLOGY  IP CONSULT TO HOSPITALIST  IP CONSULT TO CARDIOLOGY      EMERGENCY DEPARTMENT COURSE and DIFFERENTIAL DIAGNOSIS/MDM:   Vitals:    Vitals:    08/19/22 1915 08/19/22 1930 08/19/22 1945 08/19/22 2003   BP: (!) 128/44 85/60 (!) 126/56 (!) 154/112   Pulse: (!) 43 (!) 43 (!) 42 (!) 44   Resp: 15 16 14 21   Temp:    98.1 °F (36.7 °C)   TempSrc:    Oral   SpO2: 95% 95% 97% 97%   Weight:       Height:           Patient was given thefollowing medications:  Medications   ondansetron (ZOFRAN) injection 4 mg (4 mg IntraVENous Given 8/19/22 1124)   0.9 % sodium chloride infusion ( IntraVENous Handoff 8/19/22 1951)   DOBUTamine  mg in sodium chloride 0.9 % 250 mL infusion (5 mcg/kg/min × 45.5 kg (Ideal) IntraVENous New Bag 8/19/22 2035)   sodium chloride flush 0.9 % injection 5-40 mL (has no administration in time range)   sodium chloride flush 0.9 % injection 5-40 mL (has no administration in time range)   0.9 % sodium chloride infusion (has no administration in time range)   enoxaparin (LOVENOX) injection 40 mg (0 mg SubCUTAneous Held 8/19/22 1319)   ondansetron (ZOFRAN-ODT) disintegrating tablet 4 mg (has no administration in time range)   acetaminophen (TYLENOL) tablet 650 mg (has no administration in time range)     Or   acetaminophen (TYLENOL) suppository 650 mg (has no administration in time range)   pantoprazole (PROTONIX) tablet 40 mg (has no administration in time range)   ezetimibe (ZETIA) tablet 10 mg (has no administration in time range)   PARoxetine (PAXIL) tablet 20 mg (has no administration in time range)   hydrALAZINE (APRESOLINE) injection 10 mg (has no administration in time range)   glucagon (rDNA) injection 1 mg (1 mg IntraVENous Given 8/19/22 1125)         Is this patient to be included in the SEP-1 Core Measure due to severe sepsis or septic shock? No   Exclusion criteria - the patient is NOT to be included for SEP-1 Core Measure due to: Infection is not suspected    Patient is a well-appearing 26-year-old female presents with above HPI. Patient is a patient of cardiologist Dr. Randall Abbasi. Patient had been at his office earlier today and advised to come to ED. Prior to patient's arrival, I did speak with Dr. Jerry Tse who was familiar with patient. Patient had a unremarkable cardiac catheterization 2019. He mentions patient symptoms, bradycardia on beta-blocker. He had recommended blood work to include INR, TSH, T4, CBC, CMP, chest x-ray. At this time no need for troponin. I saw patient shortly after arrival to exam room she is alert and oriented no acute distress. Work-up initiated. @3169 patient lab work returned. Patient discussed with ED attending Dr. Reyes Birks, as well as Dr. Jerry Tse. Concern for intermittent third-degree block. Pacer pads placed.   He is recommending admission to ICU.      @7605 per Dr. Uriel Wilkinson, he had spoken to ICU/Dr. Valeriy Way who accepted patient     FINAL IMPRESSION      1. Symptomatic bradycardia          DISPOSITION/PLAN   DISPOSITION Admitted 08/19/2022 12:30:27 PM      PATIENT REFERREDTO:  No follow-up provider specified.     DISCHARGE MEDICATIONS:  Current Discharge Medication List          DISCONTINUED MEDICATIONS:  Current Discharge Medication List        STOP taking these medications       metoprolol tartrate (LOPRESSOR) 25 MG tablet Comments:   Reason for Stopping:                      (Please note that portions ofthis note were completed with a voice recognition program.  Efforts were made to edit the dictations but occasionally words are mis-transcribed.)    Marizol Munoz PA-C (electronically signed)             Marizol Munoz PA-C  08/19/22 6901

## 2022-08-19 NOTE — ED NOTES
This RN spoke with pharmacy, states that they are still working on dobutamine at this time. States that they will call when medication is tubed to ED.       Ba Elizondo RN  08/19/22 6919

## 2022-08-19 NOTE — CONSULTS
Dictated --[id number]  Bradycardia -symptomatic with dizziness and light headed  Check echo   Check labs  Will consider pacer  She has been on low dose lopressor for 3 years    Electronically signed by Sanjiv Pham MD on 8/19/22 at 11:03 AM EDT      Spoke with EP   He will see patient later today  For now admit to hospital  Stop lopressor and watch  Check echo for EF  She may need a pacer but if it resolves after being off lopressor -consider watching and reevaluate    Electronically signed by Sanjiv Pham MD on 8/19/22 at 11:11 AM EDT

## 2022-08-19 NOTE — PROGRESS NOTES
2 to 1 AV block  Bifascicular block now probably trifascicular block with a 2-1 block  History of breast cancer  Hypertension    Patient has underlying bifascicular block previously. Patient has been on chronic metoprolol. Patient denies any syncopal episodes but that she was concerned about her heart rate being low so she decrease metoprolol to once a day but did not show much effect so she restarted it. Patient then called the primary care office and they told her to go to the hospital.  Taking metoprolol. Also has breast cancer s/p lateral mastectomies in 2018 October. Has been on tamoxifen since 2019.     Did a carotid massage did not show much improvement with the A-V conduction testing this could be type I Mobitz too    Patient has underlying bifascicular block before and now with trifascicular block given that if it is 2-1 we should have a low threshold for pacemaker but I would like to give stop metoprolol completely and see if that would improve the conduction if patient conduction does not improve in the next couple of days then we could consider pacemaker given that she is having trifascicular block and ventricular  bradycardia    Was normal EF so she will be candidate for only dual-chamber pacemaker if she needs a pacemaker    Discussed with the patient patient agrees with the plan of waiting on the pacemaker for now and stopping the medication    Discussed plan with

## 2022-08-19 NOTE — PROGRESS NOTES
She is going into intermittent complete heart block  rate in 30's  Start on Dobutamine drip    Await labs and echo

## 2022-08-19 NOTE — CONSULTS
Electrophysiology Consult Note      Reason for consultation: HEART BLOCK    Chief complaint : FATIGUE, DIZZINESS    Referring physician: Kenzie Guillen      Primary care physician: Raymond Le MD      History of Present Illness:     Gallo Simon is a 77-year-old female with a history of coronary artery disease, breast cancer, hyperlipidemia, and hypertension reports that over the course of the last week she has noted her heart rate to be in the 45s. She states that she had associated fatigue and dizziness. She stopped her metoprolol however still continued to have a heart rate in the 40s  Patient reports that she saw her primary care doctor who then contacted her cardiologist and urged her to go to the emergency room for further evaluation. Patient denies chest pain, palpitations, shortness of breath, edema or syncope. On admission sodium 137, potassium 4.1, creatinine 1.0, TSH 2.270, white blood cell count 8.4  Echo this admission feels EF of 50 to 55%. Last left heart cath on 8/14/2018 LAD circumflex and RCA noted to have minimal luminal irregularities. Patient noted to have nonobstructive coronary artery disease. Pastmedical history:   Past Medical History:   Diagnosis Date    Arthritis     CAD (coronary artery disease)     Cancer ([facility] [address]     breast, treated with radiation     Hyperlipidemia     Hypertension        Surgical history :   Past Surgical History:   Procedure Laterality Date    BACK SURGERY      BREAST SURGERY Left     lesion removed     CHOLECYSTECTOMY      HYSTERECTOMY (CERVIX STATUS UNKNOWN)      MASTECTOMY, BILATERAL Bilateral 10/19/2018       Family history:   Family History   Problem Relation Age of Onset    High Blood Pressure Mother     COPD Mother     High Blood Pressure Father     COPD Father     High Blood Pressure Sister        Social history :  reports that she has never smoked.  She has never used smokeless tobacco. She reports that she does not drink alcohol and does not use drugs. Allergies   Allergen Reactions    Cefaclor Shortness Of Breath    Ciprofloxacin Itching    Corn Oil Anaphylaxis       No current facility-administered medications on file prior to encounter. Current Outpatient Medications on File Prior to Encounter   Medication Sig Dispense Refill    Calcium Carbonate-Vit D-Min (CALCIUM 1200 PO) Take 1 tablet by mouth 2 times daily      PARoxetine (PAXIL) 20 MG tablet TAKE 1 TABLET BY MOUTH EVERY DAY 90 tablet 1    tamoxifen (NOLVADEX) 20 MG tablet Take 1 tablet by mouth in the morning. 90 tablet 1    Denosumab (PROLIA SC) Inject into the skin      potassium chloride (MICRO-K) 10 MEQ extended release capsule 10 mEq daily      montelukast (SINGULAIR) 10 MG tablet 10 mg nightly      gabapentin (NEURONTIN) 600 MG tablet Take 600 mg by mouth 3 times daily. Marva Raddle triamterene-hydroCHLOROthiazide (DYAZIDE) 37.5-25 MG per capsule Take 25 mg by mouth daily      vitamin D (CHOLECALCIFEROL) 1000 UNIT TABS tablet Take 1,000 Units by mouth daily      esomeprazole Magnesium (NEXIUM) 20 MG PACK Take 20 mg by mouth daily      ezetimibe (ZETIA) 10 MG tablet Take 10 mg by mouth daily      metoprolol tartrate (LOPRESSOR) 25 MG tablet Take 25 mg by mouth once         Review of Systems:   Review of Systems   Constitutional:  Positive for activity change and fatigue. Negative for chills and fever. HENT:  Negative for congestion, ear pain and tinnitus. Eyes:  Negative for photophobia, pain and visual disturbance. Respiratory:  Negative for cough, chest tightness, shortness of breath and wheezing. Cardiovascular:  Negative for chest pain, palpitations and leg swelling. Gastrointestinal:  Negative for abdominal pain, blood in stool, constipation, diarrhea, nausea and vomiting. Endocrine: Negative for cold intolerance and heat intolerance. Genitourinary:  Negative for dysuria, flank pain and hematuria.    Musculoskeletal:  Negative for arthralgias, back pain, myalgias and neck stiffness. Skin:  Negative for color change and rash. Allergic/Immunologic: Negative for food allergies. Neurological:  Positive for dizziness. Negative for light-headedness, numbness and headaches. Hematological:  Does not bruise/bleed easily. Psychiatric/Behavioral:  Negative for agitation, behavioral problems and confusion. Examination:      Vitals:    08/19/22 1051 08/19/22 1130 08/19/22 1233   BP: (!) 163/60 137/75 (!) 148/115   Pulse: (!) 44 (!) 41 (!) 43   Resp: 21 14 14   Temp: 98.4 °F (36.9 °C)  98.2 °F (36.8 °C)   TempSrc: Oral  Oral   SpO2: 100% 99% 97%   Weight: 163 lb (73.9 kg)     Height: 4' 9\" (1.448 m)          Body mass index is 35.27 kg/m². Physical Exam  Constitutional:       General: She is not in acute distress. Appearance: She is not ill-appearing or diaphoretic. HENT:      Head: Normocephalic and atraumatic. Right Ear: External ear normal.      Left Ear: External ear normal.      Nose: No congestion. Mouth/Throat:      Mouth: Mucous membranes are moist.   Eyes:      Extraocular Movements: Extraocular movements intact. Conjunctiva/sclera: Conjunctivae normal.      Pupils: Pupils are equal, round, and reactive to light. Cardiovascular:      Rate and Rhythm: Regular rhythm. Bradycardia present. Heart sounds: No murmur heard. Pulmonary:      Effort: Pulmonary effort is normal. No respiratory distress. Breath sounds: Normal breath sounds. No wheezing or rhonchi. Abdominal:      General: Abdomen is flat. Bowel sounds are normal. There is no distension. Palpations: Abdomen is soft. Tenderness: There is no abdominal tenderness. Musculoskeletal:         General: No tenderness. Cervical back: Normal range of motion. No tenderness. Right lower leg: No edema. Left lower leg: No edema. Skin:     General: Skin is warm. Neurological:      General: No focal deficit present. Mental Status: She is alert and oriented to person, place, and time. Cranial Nerves: No cranial nerve deficit. Psychiatric:         Mood and Affect: Mood normal.             CBC:   Lab Results   Component Value Date/Time    WBC 8.4 08/19/2022 10:19 AM    HGB 12.6 08/19/2022 10:19 AM    HCT 38.6 08/19/2022 10:19 AM     08/19/2022 10:19 AM     Lipids:  Lab Results   Component Value Date    CHOL 183 09/20/2021    TRIG 212 (H) 09/20/2021    HDL 52 09/20/2021    LDLDIRECT 98 09/20/2021     PT/INR:   Lab Results   Component Value Date/Time    INR 0.86 08/19/2022 10:19 AM        BMP:    Lab Results   Component Value Date     08/19/2022    K 4.1 08/19/2022     08/19/2022    CO2 22 08/19/2022    BUN 27 (H) 08/19/2022     CMP:   Lab Results   Component Value Date    AST 20 08/19/2022    PROT 7.4 08/19/2022    BILITOT 0.2 08/19/2022    ALKPHOS 49 08/19/2022     TSH:  No results found for: TSH, T4    EKGINTERPRETATION - EKG Interpretation:      ASA -3  MALLAMPATI -  3      IMPRESSION / RECOMMENDATIONS:     2 to 1 AV block  Bifascicular block now probably trifascicular block with a 2-1 block  History of breast cancer  Hypertension     Patient has underlying bifascicular block previously. Patient has been on chronic metoprolol. Patient denies any syncopal episodes but that she was concerned about her heart rate being low so she decrease metoprolol to once a day but did not show much effect so she restarted it. Patient then called the primary care office and they told her to go to the hospital.  Taking metoprolol. Also has breast cancer s/p lateral mastectomies in 2018 October. Has been on tamoxifen since 2019.      Did a carotid massage did not show much improvement with the A-V conduction testing this could be type I Mobitz too     Patient has underlying bifascicular block before and now with trifascicular block given that if it is 2-1 we should have a low threshold for pacemaker but I would like to give stop metoprolol completely and see if that would improve the conduction if patient conduction does not improve in the next couple of days then we could consider pacemaker given that she is having trifascicular block and ventricular  bradycardia     Was normal EF so she will be candidate for only dual-chamber pacemaker if she needs a pacemaker     Discussed with the patient patient agrees with the plan of waiting on the pacemaker for now and stopping the medication     Discussed plan with           Thanks again for allowing me to participate in care of this patient. Please call me if you have any questions. With best regards. Wellington Stanton MD, 8/19/2022 2:36 PM     Please note this report has been partially produced using speech recognition software and may contain errors related to that system including errors in grammar, punctuation, and spelling, as well as words and phrases that may be inappropriate. If there are any questions or concerns please feel free to contact the dictating provider for clarification.

## 2022-08-19 NOTE — CONSULTS
[address], [address] [facility]                                  CONSULTATION    PATIENT NAME: Clementina Kidd                     :        1945  MED REC NO:   [id number]                          ROOM:  ACCOUNT NO:   [de-identified]                           ADMIT DATE: 2022  PROVIDER:     Chanel Narayanan MD    CONSULT DATE:  2022    INDICATION:  Bradycardia. HISTORY OF PRESENT ILLNESS:  This is a 10-year-old female patient who  follows with the office. The patient's primary physician is Dr. Kelsi Hernandez. The patient had a heart catheterization done back in 2018. I  did a heart cath on 2018. At that time, left main was patent. LAD,  circ and RCA have mild disease noted. She was treated medically. She  was having chest pain. She was placed on beta blockers. For the last  two weeks, she has been fatigued and tired and dizzy. No syncope  present, but dyspnea and lightheadedness present. She was on Lopressor  25 b.i.d. which was changed to once a day, but she is still having  symptoms present. She was seen in the office today. She was found to  have a 2:1 block noted and heart rate was in the 40s. Therefore, she  was sent to the ER. She denies any chest pain. She denies any  shortness of breath, but she does have fatigueness, dizziness and  lightheadedness present. MEDICATIONS:  She is on Lopressor 25 b.i.d., she is on Dyazide and  potassium. Zetia and Nolvadex, Paxil, Neurontin, Nexium. PAST MEDICAL HISTORY:  Hypertension, history of breast cancer, she sees  Dr. Kd Krishnan for that. She had mastectomy done bilaterally and she was  found to have malignant neoplasm of the upper quadrant left breast  present. This is I believe recently and ductal carcinoma present. I  think she _____ at this time. She has a history of hypertension present  and breast cancer present.     PAST SURGICAL HISTORY:  Back surgery, bilateral cataract surgery,  gallbladder surgery, bilateral mastectomy done. SOCIAL HISTORY:  Does not smoke. Does not drink. The patient is a  retired nurse. ALLERGIES:  CEFACLOR, CIPROFLOXACIN and CORN. PHYSICAL EXAMINATION:  GENERAL:  The patient is awake, alert, and answering questions, not in  acute distress. VITAL SIGNS:  ____ in the 45s. Blood pressure is 155/60. HEENT:  Head is normocephalic and atraumatic. Pupils are equal and  reactive. CHEST:  Equal expansion. LUNGS:  Clear to auscultation. No wheezing or rhonchi appreciated. HEART:  Regular rate and rhythm. ABDOMEN:  Soft and nontender. Bowel sounds are present. No  hepatosplenomegaly or guarding appreciated. EXTREMITIES:  No cyanosis or clubbing noted. NEUROLOGIC:  Cranial nerves II through II are grossly intact. LABORATORY DATA:  Labs are pending at this time. IMPRESSION:  This is a 77-year-old female patient who has been on  beta-blocker, now she is symptomatic, dizzy and lightheaded, found to  have bradycardia, 2:1 block present. She is on low dose beta blockers. We will obtain labs and echo and we will make further recommendations on  needing a pacemaker or not.         Dede Gross MD    D: 08/19/2022 11:02:20       T: 08/19/2022 14:03:13     [id number]  Job#: [id number]     Doc#: [id number]    CC:

## 2022-08-20 LAB
ANION GAP SERPL CALCULATED.3IONS-SCNC: 12 MMOL/L (ref 4–16)
BUN BLDV-MCNC: 21 MG/DL (ref 6–23)
CALCIUM SERPL-MCNC: 7.5 MG/DL (ref 8.3–10.6)
CHLORIDE BLD-SCNC: 109 MMOL/L (ref 99–110)
CO2: 22 MMOL/L (ref 21–32)
CREAT SERPL-MCNC: 0.9 MG/DL (ref 0.6–1.1)
GFR AFRICAN AMERICAN: >60 ML/MIN/1.73M2
GFR NON-AFRICAN AMERICAN: >60 ML/MIN/1.73M2
GLUCOSE BLD-MCNC: 116 MG/DL (ref 70–99)
MAGNESIUM: 1.6 MG/DL (ref 1.8–2.4)
POTASSIUM SERPL-SCNC: 3.5 MMOL/L (ref 3.5–5.1)
SODIUM BLD-SCNC: 143 MMOL/L (ref 135–145)
T4 TOTAL: 8.02 UG/DL (ref 4.5–11.7)

## 2022-08-20 PROCEDURE — 2580000003 HC RX 258: Performed by: STUDENT IN AN ORGANIZED HEALTH CARE EDUCATION/TRAINING PROGRAM

## 2022-08-20 PROCEDURE — 6370000000 HC RX 637 (ALT 250 FOR IP): Performed by: STUDENT IN AN ORGANIZED HEALTH CARE EDUCATION/TRAINING PROGRAM

## 2022-08-20 PROCEDURE — 94761 N-INVAS EAR/PLS OXIMETRY MLT: CPT

## 2022-08-20 PROCEDURE — 80048 BASIC METABOLIC PNL TOTAL CA: CPT

## 2022-08-20 PROCEDURE — 2580000003 HC RX 258: Performed by: INTERNAL MEDICINE

## 2022-08-20 PROCEDURE — 6370000000 HC RX 637 (ALT 250 FOR IP): Performed by: SPECIALIST

## 2022-08-20 PROCEDURE — 6360000002 HC RX W HCPCS: Performed by: STUDENT IN AN ORGANIZED HEALTH CARE EDUCATION/TRAINING PROGRAM

## 2022-08-20 PROCEDURE — 83735 ASSAY OF MAGNESIUM: CPT

## 2022-08-20 PROCEDURE — 2000000000 HC ICU R&B

## 2022-08-20 RX ORDER — TAMOXIFEN CITRATE 10 MG/1
20 TABLET ORAL NIGHTLY
Status: DISCONTINUED | OUTPATIENT
Start: 2022-08-20 | End: 2022-08-23 | Stop reason: HOSPADM

## 2022-08-20 RX ORDER — MAGNESIUM SULFATE IN WATER 40 MG/ML
2000 INJECTION, SOLUTION INTRAVENOUS ONCE
Status: COMPLETED | OUTPATIENT
Start: 2022-08-20 | End: 2022-08-20

## 2022-08-20 RX ORDER — GABAPENTIN 300 MG/1
600 CAPSULE ORAL 3 TIMES DAILY
Status: DISCONTINUED | OUTPATIENT
Start: 2022-08-20 | End: 2022-08-20 | Stop reason: DRUGHIGH

## 2022-08-20 RX ORDER — GABAPENTIN 300 MG/1
300 CAPSULE ORAL 3 TIMES DAILY
Status: DISCONTINUED | OUTPATIENT
Start: 2022-08-20 | End: 2022-08-22

## 2022-08-20 RX ORDER — EZETIMIBE 10 MG/1
10 TABLET ORAL NIGHTLY
Status: DISCONTINUED | OUTPATIENT
Start: 2022-08-20 | End: 2022-08-23 | Stop reason: HOSPADM

## 2022-08-20 RX ORDER — PAROXETINE HYDROCHLORIDE 20 MG/1
20 TABLET, FILM COATED ORAL NIGHTLY
Status: DISCONTINUED | OUTPATIENT
Start: 2022-08-20 | End: 2022-08-23 | Stop reason: HOSPADM

## 2022-08-20 RX ADMIN — GABAPENTIN 300 MG: 300 CAPSULE ORAL at 20:58

## 2022-08-20 RX ADMIN — ENOXAPARIN SODIUM 40 MG: 100 INJECTION SUBCUTANEOUS at 09:07

## 2022-08-20 RX ADMIN — EZETIMIBE 10 MG: 10 TABLET ORAL at 20:58

## 2022-08-20 RX ADMIN — ACETAMINOPHEN 650 MG: 325 TABLET ORAL at 20:58

## 2022-08-20 RX ADMIN — SODIUM CHLORIDE: 9 INJECTION, SOLUTION INTRAVENOUS at 19:15

## 2022-08-20 RX ADMIN — PAROXETINE HYDROCHLORIDE 20 MG: 20 TABLET, FILM COATED ORAL at 20:58

## 2022-08-20 RX ADMIN — TAMOXIFEN CITRATE 20 MG: 10 TABLET, FILM COATED ORAL at 21:03

## 2022-08-20 RX ADMIN — SODIUM CHLORIDE 1 ML: 9 INJECTION, SOLUTION INTRAVENOUS at 02:15

## 2022-08-20 RX ADMIN — MAGNESIUM SULFATE HEPTAHYDRATE 2000 MG: 2 INJECTION, SOLUTION INTRAVENOUS at 15:30

## 2022-08-20 RX ADMIN — GABAPENTIN 300 MG: 300 CAPSULE ORAL at 12:50

## 2022-08-20 RX ADMIN — SODIUM CHLORIDE, PRESERVATIVE FREE 10 ML: 5 INJECTION INTRAVENOUS at 08:39

## 2022-08-20 RX ADMIN — PANTOPRAZOLE SODIUM 40 MG: 40 TABLET, DELAYED RELEASE ORAL at 09:07

## 2022-08-20 RX ADMIN — SODIUM CHLORIDE, PRESERVATIVE FREE 10 ML: 5 INJECTION INTRAVENOUS at 21:01

## 2022-08-20 ASSESSMENT — PAIN SCALES - GENERAL
PAINLEVEL_OUTOF10: 0
PAINLEVEL_OUTOF10: 0
PAINLEVEL_OUTOF10: 4

## 2022-08-20 ASSESSMENT — PAIN - FUNCTIONAL ASSESSMENT: PAIN_FUNCTIONAL_ASSESSMENT: ACTIVITIES ARE NOT PREVENTED

## 2022-08-20 ASSESSMENT — PAIN DESCRIPTION - LOCATION: LOCATION: HEAD;BACK

## 2022-08-20 ASSESSMENT — PAIN DESCRIPTION - FREQUENCY: FREQUENCY: INTERMITTENT

## 2022-08-20 ASSESSMENT — PAIN DESCRIPTION - PAIN TYPE: TYPE: ACUTE PAIN

## 2022-08-20 ASSESSMENT — PAIN DESCRIPTION - DESCRIPTORS: DESCRIPTORS: ACHING

## 2022-08-20 ASSESSMENT — PAIN DESCRIPTION - ONSET: ONSET: ON-GOING

## 2022-08-20 NOTE — PROGRESS NOTES
Pleasant lady admitted to unit with heart block (acceptable hemodynamics, respiratory status \"can I go home\"), currently managed by Cardiology service. No true critical care needs, defer management to cardiology service, call if assistance required. Management reviewed during critical care rounds.      Manav [facility]  [phone]

## 2022-08-20 NOTE — PROGRESS NOTES
V2.0  [facility] Hospitalist Progress Note      Name:  Stefani Sosa /Age/Sex: 1945  (64 y.o. female)   MRN & CSN:  [id number] & [id number] Encounter Date/Time: 2022 2:39 PM EDT    Location:  - PCP: Tonya Lin MD       Hospital Day: 2    Assessment and Plan:   Stefani Sosa is a 64 y.o. female with a PMHx of  hyperlipidemia Breast cancer s/p mastectomies and hypertension who presents with Symptomatic bradycardia. *High grade symptomatic Bradycardia (2:1 AV block)  *Chronic Bifasicular block  Avoid AV mirella blocking agents  Monitor in ICU on telemetry  EP and cardiology following, evaluation for pacemaker  Discussed with critical care team    Hypomagnesemia  Replete  Monitor BMP in am    Obesity Class I  Counseled regarding weight loss    Other chronic medical conditions include:   GERD: Continue protonix  Hypertension: can utilize hydralazine prn for SBP >160  Breast cancer s/p mastectomy  Neuropathy: continue gabapentin  Osteoporosis- continue Ca supplementation  Hyperlipidemia- continue zetia  Post Menopausal syndrome- continue tamoxifen    Diet ADULT DIET; Regular   DVT Prophylaxis [x] Lovenox, []  Heparin, [] SCDs, [] Ambulation,  [] Eliquis, [] Xarelto  [] Coumadin   Code Status Limited   Disposition From: Home  Expected Disposition: Home  Estimated Date of Discharge: 1-2 days  Patient requires continued admission due to evaluation for pacemaker   Surrogate Decision Maker/ POA Brother Theresa     Subjective:     Chief Complaint: Bradycardia (HR in 40s; sent from [facility] Cardiology office)       Stefani Sosa is a 64 y.o. female who presents with symptomatic bradycardia. Patient was seen and evaluated at bedside. Resting comfortably, HR averaging around 40-50. Wants to go home.       Objective:   No intake or output data in the 24 hours ending 22 1446     Vitals:   Vitals:    22 1200   BP: (!) 141/38   Pulse: (!) 45   Resp: 17   Temp:    SpO2: 97%       Physical Exam: Physical Exam  Vitals reviewed. Constitutional:       Appearance: Normal appearance. She is normal weight. HENT:      Head: Normocephalic. Nose: Nose normal.      Mouth/Throat:      Mouth: Mucous membranes are moist.   Eyes:      Conjunctiva/sclera: Conjunctivae normal.      Pupils: Pupils are equal, round, and reactive to light. Cardiovascular:      Rate and Rhythm: Regular rhythm. Bradycardia present. Pulses: Normal pulses. Heart sounds: Normal heart sounds. No murmur heard. Pulmonary:      Effort: Pulmonary effort is normal.      Breath sounds: Normal breath sounds. No wheezing, rhonchi or rales. Abdominal:      General: Abdomen is flat. Bowel sounds are normal. There is no distension. Palpations: Abdomen is soft. Tenderness: There is no abdominal tenderness. Musculoskeletal:         General: No deformity. Normal range of motion. Cervical back: Normal range of motion and neck supple. Right lower leg: No edema. Left lower leg: No edema. Skin:     Coloration: Skin is not jaundiced or pale. Neurological:      General: No focal deficit present. Mental Status: She is alert and oriented to person, place, and time. Mental status is at baseline.           Medications:   Medications:    ezetimibe  10 mg Oral Nightly    PARoxetine  20 mg Oral Nightly    tamoxifen  20 mg Oral Nightly    gabapentin  300 mg Oral TID    magnesium sulfate  2,000 mg IntraVENous Once    sodium chloride flush  5-40 mL IntraVENous 2 times per day    enoxaparin  40 mg SubCUTAneous Daily    pantoprazole  40 mg Oral Daily      Infusions:    sodium chloride 1 mL (08/20/22 0215)    DOBUTamine 5 mcg/kg/min (08/19/22 2035)    sodium chloride       PRN Meds: ondansetron, 4 mg, Q30 Min PRN  sodium chloride flush, 5-40 mL, PRN  sodium chloride, , PRN  ondansetron, 4 mg, Q8H PRN  acetaminophen, 650 mg, Q6H PRN   Or  acetaminophen, 650 mg, Q6H PRN  hydrALAZINE, 10 mg, Q4H PRN      Labs      Recent Results (from the past 24 hour(s))   Basic Metabolic Panel    Collection Time: 08/20/22 11:00 AM   Result Value Ref Range    Sodium 143 135 - 145 MMOL/L    Potassium 3.5 3.5 - 5.1 MMOL/L    Chloride 109 99 - 110 mMol/L    CO2 22 21 - 32 MMOL/L    Anion Gap 12 4 - 16    BUN 21 6 - 23 MG/DL    Creatinine 0.9 0.6 - 1.1 MG/DL    Glucose 116 (H) 70 - 99 MG/DL    Calcium 7.5 (L) 8.3 - 10.6 MG/DL    GFR Non-African American >60 >60 mL/min/1.73m2    GFR African American >60 >60 mL/min/1.73m2   Magnesium    Collection Time: 08/20/22 11:00 AM   Result Value Ref Range    Magnesium 1.6 (L) 1.8 - 2.4 mg/dl        Imaging/Diagnostics Last 24 Hours   Echocardiogram complete 2D with doppler with color    Result Date: 8/19/2022  Transthoracic Echocardiography Report (TTE)  Demographics   Patient Name       Erum CAES    Date of Study       08/19/2022   Date of Birth      1945        Gender              Female   Age                64 year(s)        Race                   Patient Number     [id number]        Room Number         ED27   Visit Number       [id number]   Corporate ID       [id number]   Accession Number   [id number]        ANIL García RVT   Ordering Physician Drea Carrera MD  Interpreting        Drea Carrera MD                                       Physician  Procedure Type of Study   TTE procedure:ECHOCARDIOGRAM COMPLETE 2D W DOPPLER W COLOR. Procedure Date Date: 08/19/2022 Start: 11:18 AM Study Location: Portable Technical Quality: Adequate visualization Indications:Chest pain.  Patient Status: Routine Height: 57 inches Weight: 163 pounds BSA: 1.65 m2 BMI: 35.27 kg/m2 HR: 42 bpm BP: 163/60 mmHg  Conclusions   Summary  Left ventricular systolic function is normal.  Ejection fraction is visually estimated at 50-55%. No significant valvular disease noted. No evidence of any pericardial effusion. Signature   ------------------------------------------------------------------  Electronically signed by Truman Ramirez MD (Interpreting  physician) on 08/19/2022 at 11:52 AM  ------------------------------------------------------------------   Findings   Left Ventricle  Left ventricular systolic function is normal.  Ejection fraction is visually estimated at 50-55%. No regional wall motion abnormalites. Left ventricle size is normal.  Normal diastolic function. Left Atrium  Essentially normal left atrium. Right Atrium  Essentially normal right atrium. Right Ventricle  Essentially normal right ventricle. Aortic Valve  Structurally normal aortic valve. Mitral Valve  Mild mitral regurgitation. Tricuspid Valve  No evidence of tricuspid regurgitation. Pulmonic Valve  The pulmonic valve was not well visualized. Pericardial Effusion  No evidence of any pericardial effusion. Pleural Effusion  No evidence of pleural effusion. Miscellaneous  IVC and abdominal aorta are within normal limits.   M-Mode/2D Measurements & Calculations   LV Diastolic Dimension:  LV Systolic Dimension:  LA Dimension: 3.5 cmAO Root  4.54 cm                  2.77 cm                 Dimension: 2.8 cmLA Area:  LV FS:39 %               LV Volume Diastolic: 66 32.7 cm2  LV PW Diastolic: 3.69 cm ml  LV PW Systolic: 1.46 cm  LV Volume Systolic: 29  Septum Diastolic: 3.81   ml  cm                       LV EDV/LV EDV Index: 66 RV Diastolic Dimension:  Septum Systolic: 6.44 cm UN/65 N4RT ESV/LV ESV   2.74 cm  CO: 3.19 l/min           Index: 29 ml/18 m2  CI: 1.93 l/m*m2          EF Calculated (A4C):    LA/Aorta: 1.25                           56.1 %  LV Area Diastolic: 81.2  EF Calculated (2D):     LA volume/Index: 46 ml  cm2                      69.5 %                  /97X6  LV Area Systolic: 15 cm2 LV Length: 7.46 cm                            LVOT: 1.9 cm  Doppler Measurements & Calculations   MV Peak E-Wave: 137     AV Peak Velocity: 126 cm/s   LVOT Peak Velocity: 114  cm/s                    AV Peak Gradient: 6.35 mmHg  cm/s  MV Peak A-Wave: 103     AV Mean Velocity: 85.3 cm/s  LVOT Mean Velocity:  cm/s                    AV Mean Gradient: 3 mmHg     78.6 cm/s  MV E/A Ratio: 1.33      AV VTI: 29.1 cm              LVOT Peak Gradient: 5  MV Peak Gradient: 7.51  AV Area (Continuity):2.61    mmHgLVOT Mean Gradient:  mmHg                    cm2                          3 mmHg   MV P1/2t: 48 msec       LVOT VTI: 26.8 cm  MVA by PHT:4.58 cm2   MV E' Septal Velocity:  14.9 cm/s  MV E' Lateral Velocity:  12.6 cm/s  MV E/E' septal: 9.19  MV E/E' lateral: 10.87      XR CHEST PORTABLE    Result Date: 8/19/2022  EXAMINATION: ONE XRAY VIEW OF THE CHEST 8/19/2022 11:06 am COMPARISON: 10/15/2018 HISTORY: ORDERING SYSTEM PROVIDED HISTORY: dizziness TECHNOLOGIST PROVIDED HISTORY: Reason for exam:->dizziness Reason for Exam: dizziness FINDINGS: The lungs are without acute focal process. There is no effusion or pneumothorax. The cardiomediastinal silhouette is without acute process. The osseous structures are without acute process. No acute process.        Electronically signed by Raven Chen MD on 8/20/2022 at 2:46 PM

## 2022-08-20 NOTE — PROGRESS NOTES
Physician Progress Note      Maximus Gil  CSN #:                  [id number]  :                       1945  ADMIT DATE:       2022 10:45 AM  DISCH DATE:  RESPONDING  PROVIDER #:        Rustam Bailey          QUERY TEXT:    Patient admitted with 2:1 heart block. Noted documentation of Acute Kidney   Injury in H&P. In order to support the diagnosis of GEMMA, please include   additional clinical indicators in your documentation. ? Or please document if   the diagnosis of GEMMA has been ruled out after further study. The medical record reflects the following:  Risk Factors: Age, HTN  Clinical Indicators: H&P notes GEMMA - likely prerenal, SCr 1, GFR 54. Treatment: CMP,  0.9% NS 75 ml/hr    Defined by Kidney Disease Improving Global Outcomes (KDIGO) clinical practice   guideline for acute kidney injury:  -Increase in SCr by greater than or equal to 0.3 mg/dl within 48 hours; or  -Increase or decrease in SCr to greater than or equal to 1.5 times baseline,   which is known or presumed to have occurred within the prior 7 days; or  -Urine volume < 0.5ml/kg/h for 6 hours. Thank you,  ANKUR MauriceN, RN, [facility]  [phone]  Options provided:  -- Acute kidney injury evidenced by, Please document evidence as well as a   numerical baseline creatinine, if known. -- Acute kidney injury ruled out after study  -- Other - I will add my own diagnosis  -- Disagree - Not applicable / Not valid  -- Disagree - Clinically unable to determine / Unknown  -- Refer to Clinical Documentation Reviewer    PROVIDER RESPONSE TEXT:    Acute kidney injury was ruled out after study.     Query created by: Carrillo López on 2022 7:39 AM      Electronically signed by:  Rustam Bailey 2022 2:38 PM

## 2022-08-20 NOTE — PROGRESS NOTES
Daily Progress Note  Subjective:  Awake and alert; feeling fair  Does feel fatigued  2:1 AV block on tele  BP stable  Check labs    Attending Note:    ATTENDING NOTE    I have personally seen and examined this patient. I have fully participated in the care of this patient and I have reviewed and agree with all pertinent clinical information including history, physical exam, and plan. See my impression and plan below. Sinus bradycardia. On dobutamine for rate control. Blood pressure is stable. EP is following. So likely may benefit from pacemaker if the heart rate does not improve. Electronically signed by Papito Soriano DO on 8/20/22 at 1:16 PM EDT      Impression and Plan:   Bradycardia    D/t 2:1 AV block; Occasional 3:1 block present as well    Was previously on metoprolol, has been on hold    EP has been consulted    Waiting for betablocker to wash out    Currently on Dobutamine at 5mcg; HR in the 40's    Mild symptoms at this time; fatigue, occasional episodes of diaphoresis    Will wait on further recommendations for EP    May need PPM    Hx of HTN; remaining stable    Will cont' to monitor while metoprolol washes out  Will cont' to follow    Most Recent Echo   8/19/2022 Summary   Left ventricular systolic function is normal.   Ejection fraction is visually estimated at 50-55%. No significant valvular disease noted. No evidence of any pericardial effusion. MEDICATIONS:  She is on Lopressor 25 b.i.d., she is on Dyazide and  potassium. Zetia and Nolvadex, Paxil, Neurontin, Nexium. PAST MEDICAL HISTORY:  Hypertension, history of breast cancer, she sees  Dr. Marissa Cha for that. She had mastectomy done bilaterally and she was  found to have malignant neoplasm of the upper quadrant left breast  present. This is I believe recently and ductal carcinoma present. I  think she _____ at this time. She has a history of hypertension present  and breast cancer present.      PAST SURGICAL 08/19/2022    GEMMA (acute kidney injury) ([facility] 08/19/2022    Osteopenia of multiple sites 09/27/2021    Malignant neoplasm of upper-outer quadrant of left female breast ([facility] 08/14/2020    Lumbosacral radiculopathy 01/15/2016    Lumbar stenosis with neurogenic claudication 01/15/2016    Spinal stenosis, lumbar region, without neurogenic claudication 01/15/2016       Electronically signed by GABRIEL Vidales CNP on 8/20/2022 at 10:43 AM

## 2022-08-21 LAB
ANION GAP SERPL CALCULATED.3IONS-SCNC: 13 MMOL/L (ref 4–16)
BUN BLDV-MCNC: 17 MG/DL (ref 6–23)
CALCIUM SERPL-MCNC: 7.9 MG/DL (ref 8.3–10.6)
CHLORIDE BLD-SCNC: 108 MMOL/L (ref 99–110)
CO2: 20 MMOL/L (ref 21–32)
CREAT SERPL-MCNC: 0.9 MG/DL (ref 0.6–1.1)
GFR AFRICAN AMERICAN: >60 ML/MIN/1.73M2
GFR NON-AFRICAN AMERICAN: >60 ML/MIN/1.73M2
GLUCOSE BLD-MCNC: 118 MG/DL (ref 70–99)
MAGNESIUM: 1.9 MG/DL (ref 1.8–2.4)
POTASSIUM SERPL-SCNC: 4.1 MMOL/L (ref 3.5–5.1)
SODIUM BLD-SCNC: 141 MMOL/L (ref 135–145)

## 2022-08-21 PROCEDURE — 6370000000 HC RX 637 (ALT 250 FOR IP): Performed by: STUDENT IN AN ORGANIZED HEALTH CARE EDUCATION/TRAINING PROGRAM

## 2022-08-21 PROCEDURE — 2580000003 HC RX 258: Performed by: INTERNAL MEDICINE

## 2022-08-21 PROCEDURE — 80048 BASIC METABOLIC PNL TOTAL CA: CPT

## 2022-08-21 PROCEDURE — 83735 ASSAY OF MAGNESIUM: CPT

## 2022-08-21 PROCEDURE — 94761 N-INVAS EAR/PLS OXIMETRY MLT: CPT

## 2022-08-21 PROCEDURE — 2580000003 HC RX 258: Performed by: STUDENT IN AN ORGANIZED HEALTH CARE EDUCATION/TRAINING PROGRAM

## 2022-08-21 PROCEDURE — 6360000002 HC RX W HCPCS: Performed by: INTERNAL MEDICINE

## 2022-08-21 PROCEDURE — 93005 ELECTROCARDIOGRAM TRACING: CPT | Performed by: NURSE PRACTITIONER

## 2022-08-21 PROCEDURE — 6370000000 HC RX 637 (ALT 250 FOR IP): Performed by: SPECIALIST

## 2022-08-21 PROCEDURE — 6360000002 HC RX W HCPCS: Performed by: STUDENT IN AN ORGANIZED HEALTH CARE EDUCATION/TRAINING PROGRAM

## 2022-08-21 PROCEDURE — 2000000000 HC ICU R&B

## 2022-08-21 RX ORDER — ALPRAZOLAM 0.5 MG/1
0.5 TABLET ORAL NIGHTLY PRN
Status: DISCONTINUED | OUTPATIENT
Start: 2022-08-21 | End: 2022-08-23 | Stop reason: HOSPADM

## 2022-08-21 RX ADMIN — GABAPENTIN 300 MG: 300 CAPSULE ORAL at 08:51

## 2022-08-21 RX ADMIN — ACETAMINOPHEN 650 MG: 325 TABLET ORAL at 21:10

## 2022-08-21 RX ADMIN — SODIUM CHLORIDE: 9 INJECTION, SOLUTION INTRAVENOUS at 09:17

## 2022-08-21 RX ADMIN — GABAPENTIN 300 MG: 300 CAPSULE ORAL at 16:35

## 2022-08-21 RX ADMIN — ONDANSETRON 4 MG: 4 TABLET, ORALLY DISINTEGRATING ORAL at 22:53

## 2022-08-21 RX ADMIN — TAMOXIFEN CITRATE 20 MG: 10 TABLET, FILM COATED ORAL at 21:28

## 2022-08-21 RX ADMIN — ALPRAZOLAM 0.5 MG: 0.5 TABLET ORAL at 22:53

## 2022-08-21 RX ADMIN — SODIUM CHLORIDE, PRESERVATIVE FREE 10 ML: 5 INJECTION INTRAVENOUS at 21:11

## 2022-08-21 RX ADMIN — PAROXETINE HYDROCHLORIDE 20 MG: 20 TABLET, FILM COATED ORAL at 21:10

## 2022-08-21 RX ADMIN — SODIUM CHLORIDE, PRESERVATIVE FREE 10 ML: 5 INJECTION INTRAVENOUS at 08:19

## 2022-08-21 RX ADMIN — GABAPENTIN 300 MG: 300 CAPSULE ORAL at 21:11

## 2022-08-21 RX ADMIN — HYDRALAZINE HYDROCHLORIDE 10 MG: 20 INJECTION INTRAMUSCULAR; INTRAVENOUS at 21:11

## 2022-08-21 RX ADMIN — PANTOPRAZOLE SODIUM 40 MG: 40 TABLET, DELAYED RELEASE ORAL at 08:51

## 2022-08-21 RX ADMIN — DOBUTAMINE 5 MCG/KG/MIN: 12.5 INJECTION, SOLUTION, CONCENTRATE INTRAVENOUS at 11:29

## 2022-08-21 RX ADMIN — ENOXAPARIN SODIUM 40 MG: 100 INJECTION SUBCUTANEOUS at 08:51

## 2022-08-21 RX ADMIN — EZETIMIBE 10 MG: 10 TABLET ORAL at 21:11

## 2022-08-21 ASSESSMENT — PAIN DESCRIPTION - LOCATION
LOCATION: HEAD
LOCATION: HEAD

## 2022-08-21 ASSESSMENT — PAIN SCALES - GENERAL
PAINLEVEL_OUTOF10: 5
PAINLEVEL_OUTOF10: 5

## 2022-08-21 NOTE — PROGRESS NOTES
V2.0  [facility] Hospitalist Progress Note      Name:  Oscar Fernández /Age/Sex: 1945  (64 y.o. female)   MRN & CSN:  [id number] & [id number] Encounter Date/Time: 2022 2:39 PM EDT    Location:  -A PCP: Marilin Contreras MD       Hospital Day: 3    Assessment and Plan:   Oscar Fernández is a 64 y.o. female with a PMHx of  hyperlipidemia Breast cancer s/p mastectomies and hypertension who presents with Symptomatic bradycardia. *High grade symptomatic Bradycardia (2:1 AV block)  *Chronic Bifasicular block  Avoid AV mirella blocking agents  Monitor in ICU on telemetry  EP and cardiology following, evaluation for pacemaker  Discussed with critical care team    Obesity Class I  Counseled regarding weight loss    Other chronic medical conditions include:   GERD: Continue protonix  Hypertension: can utilize hydralazine prn for SBP >160  Breast cancer s/p mastectomy  Neuropathy: continue gabapentin  Osteoporosis- continue Ca supplementation  Hyperlipidemia- continue zetia  Post Menopausal syndrome- continue tamoxifen    Diet ADULT DIET; Regular   DVT Prophylaxis [x] Lovenox, []  Heparin, [] SCDs, [] Ambulation,  [] Eliquis, [] Xarelto  [] Coumadin   Code Status Limited   Disposition From: Home  Expected Disposition: Home  Estimated Date of Discharge: 1-2 days  Patient requires continued admission due to evaluation for pacemaker   Surrogate Decision Maker/ POA Brother Theresa     Subjective:     Chief Complaint: Bradycardia (HR in 40s; sent from [facility] Cardiology office)       Oscar Fernández is a 64 y.o. female who presents with symptomatic bradycardia. Patient was seen and evaluated at bedside. Patient was resting comfortably a heart rate above 50. Objective:      Intake/Output Summary (Last 24 hours) at 2022 1306  Last data filed at 2022 0500  Gross per 24 hour   Intake 1808.39 ml   Output 1625 ml   Net 183.39 ml        Vitals:   Vitals:    22 0900   BP: (!) 160/92   Pulse: (!) 45 Resp: 17   Temp:    SpO2: 96%       Physical Exam:   Physical Exam  Vitals reviewed. Constitutional:       Appearance: Normal appearance. She is normal weight. HENT:      Head: Normocephalic. Nose: Nose normal.      Mouth/Throat:      Mouth: Mucous membranes are moist.   Eyes:      Conjunctiva/sclera: Conjunctivae normal.      Pupils: Pupils are equal, round, and reactive to light. Cardiovascular:      Rate and Rhythm: Regular rhythm. Bradycardia present. Pulses: Normal pulses. Heart sounds: Normal heart sounds. No murmur heard. Pulmonary:      Effort: Pulmonary effort is normal.      Breath sounds: Normal breath sounds. No wheezing, rhonchi or rales. Abdominal:      General: Abdomen is flat. Bowel sounds are normal. There is no distension. Palpations: Abdomen is soft. Tenderness: There is no abdominal tenderness. Musculoskeletal:         General: No deformity. Normal range of motion. Cervical back: Normal range of motion and neck supple. Right lower leg: No edema. Left lower leg: No edema. Skin:     Coloration: Skin is not jaundiced or pale. Neurological:      General: No focal deficit present. Mental Status: She is alert and oriented to person, place, and time. Mental status is at baseline.           Medications:   Medications:    ezetimibe  10 mg Oral Nightly    PARoxetine  20 mg Oral Nightly    tamoxifen  20 mg Oral Nightly    gabapentin  300 mg Oral TID    sodium chloride flush  5-40 mL IntraVENous 2 times per day    enoxaparin  40 mg SubCUTAneous Daily    pantoprazole  40 mg Oral Daily      Infusions:    sodium chloride 75 mL/hr at 08/21/22 0917    DOBUTamine 5 mcg/kg/min (08/21/22 1129)    sodium chloride       PRN Meds: ondansetron, 4 mg, Q30 Min PRN  sodium chloride flush, 5-40 mL, PRN  sodium chloride, , PRN  ondansetron, 4 mg, Q8H PRN  acetaminophen, 650 mg, Q6H PRN   Or  acetaminophen, 650 mg, Q6H PRN  hydrALAZINE, 10 mg, Q4H PRN      Labs No results found for this or any previous visit (from the past 24 hour(s)). Imaging/Diagnostics Last 24 Hours   Echocardiogram complete 2D with doppler with color    Result Date: 8/19/2022  Transthoracic Echocardiography Report (TTE)  Demographics   Patient Name       Erum CASE    Date of Study       08/19/2022   Date of Birth      1945        Gender              Female   Age                64 year(s)        Race                   Patient Number     [id number]        Room Number         ED27   Visit Number       [id number]   Corporate ID       [id number]   Accession Number   [id number]        Sonographer         ANIL Flores RVT   Ordering Physician Drea Carrera MD  Interpreting        Drea Carrera MD                                       Physician  Procedure Type of Study   TTE procedure:ECHOCARDIOGRAM COMPLETE 2D W DOPPLER W COLOR. Procedure Date Date: 08/19/2022 Start: 11:18 AM Study Location: Portable Technical Quality: Adequate visualization Indications:Chest pain. Patient Status: Routine Height: 57 inches Weight: 163 pounds BSA: 1.65 m2 BMI: 35.27 kg/m2 HR: 42 bpm BP: 163/60 mmHg  Conclusions   Summary  Left ventricular systolic function is normal.  Ejection fraction is visually estimated at 50-55%. No significant valvular disease noted. No evidence of any pericardial effusion. Signature   ------------------------------------------------------------------  Electronically signed by Drea Carrera MD (Interpreting  physician) on 08/19/2022 at 11:52 AM  ------------------------------------------------------------------   Findings   Left Ventricle  Left ventricular systolic function is normal.  Ejection fraction is visually estimated at 50-55%.   No regional wall motion abnormalites. Left ventricle size is normal.  Normal diastolic function. Left Atrium  Essentially normal left atrium. Right Atrium  Essentially normal right atrium. Right Ventricle  Essentially normal right ventricle. Aortic Valve  Structurally normal aortic valve. Mitral Valve  Mild mitral regurgitation. Tricuspid Valve  No evidence of tricuspid regurgitation. Pulmonic Valve  The pulmonic valve was not well visualized. Pericardial Effusion  No evidence of any pericardial effusion. Pleural Effusion  No evidence of pleural effusion. Miscellaneous  IVC and abdominal aorta are within normal limits.   M-Mode/2D Measurements & Calculations   LV Diastolic Dimension:  LV Systolic Dimension:  LA Dimension: 3.5 cmAO Root  4.54 cm                  2.77 cm                 Dimension: 2.8 cmLA Area:  LV FS:39 %               LV Volume Diastolic: 66 47.9 cm2  LV PW Diastolic: 1.31 cm ml  LV PW Systolic: 2.17 cm  LV Volume Systolic: 29  Septum Diastolic: 3.40   ml  cm                       LV EDV/LV EDV Index: 66 RV Diastolic Dimension:  Septum Systolic: 5.63 cm BU/30 [id number] ESV/LV ESV   2.74 cm  CO: 3.19 l/min           Index: 29 ml/18 m2  CI: 1.93 l/m*m2          EF Calculated (A4C):    LA/Aorta: 1.25                           56.1 %  LV Area Diastolic: 68.2  EF Calculated (2D):     LA volume/Index: 46 ml  cm2                      69.5 %                  /73Y5  LV Area Systolic: 15 cm2                           LV Length: 7.46 cm                            LVOT: 1.9 cm  Doppler Measurements & Calculations   MV Peak E-Wave: 137     AV Peak Velocity: 126 cm/s   LVOT Peak Velocity: 114  cm/s                    AV Peak Gradient: 6.35 mmHg  cm/s  MV Peak A-Wave: 103     AV Mean Velocity: 85.3 cm/s  LVOT Mean Velocity:  cm/s                    AV Mean Gradient: 3 mmHg     78.6 cm/s  MV E/A Ratio: 1.33      AV VTI: 29.1 cm              LVOT Peak Gradient: 5  MV Peak Gradient: 7.51  AV Area (Continuity):2.61 mmHgLVOT Mean Gradient:  mmHg                    cm2                          3 mmHg   MV P1/2t: 48 msec       LVOT VTI: 26.8 cm  MVA by PHT:4.58 cm2   MV E' Septal Velocity:  14.9 cm/s  MV E' Lateral Velocity:  12.6 cm/s  MV E/E' septal: 9.19  MV E/E' lateral: 10.87      XR CHEST PORTABLE    Result Date: 8/19/2022  EXAMINATION: ONE XRAY VIEW OF THE CHEST 8/19/2022 11:06 am COMPARISON: 10/15/2018 HISTORY: ORDERING SYSTEM PROVIDED HISTORY: dizziness TECHNOLOGIST PROVIDED HISTORY: Reason for exam:->dizziness Reason for Exam: dizziness FINDINGS: The lungs are without acute focal process. There is no effusion or pneumothorax. The cardiomediastinal silhouette is without acute process. The osseous structures are without acute process. No acute process.        Electronically signed by Ivonne Mckay MD on 8/21/2022 at 1:06 PM

## 2022-08-21 NOTE — PROGRESS NOTES
Daily Progress Note  Subjective:  Awake and alert; feeling well  Able to sleep better overnight  Denies any CP or SOB  2:1 AV block still on tele  BP stable    Attending Note:    ATTENDING NOTE    I have personally seen and examined this patient. I have fully participated in the care of this patient and I have reviewed and agree with all pertinent clinical information including history, physical exam, and plan. See my impression and plan below. Bradycardic. Blood pressure stable. Still on dobutamine. Would benefit from pacemaker defer that to EP. Electronically signed by Amparo Christianson DO on 8/21/22 at 1:20 PM EDT      Impression and Plan:   Bradycardia    D/t 2:1 AV block; Occasional 3:1 block present as well    Was previously on metoprolol, has been on hold    EP has been consulted    Waiting for betablocker to wash out    Currently on Dobutamine at 5mcg; HR in the 40's    Mild symptoms at this time; fatigue, occasional episodes of diaphoresis    Will wait on further recommendations for EP    May need PPM    Replace electrolytes     Hx of HTN; remaining stable     Will cont' to monitor while metoprolol washes out  Will cont' to follow     Most Recent Echo   8/19/2022 Summary   Left ventricular systolic function is normal.   Ejection fraction is visually estimated at 50-55%. No significant valvular disease noted. No evidence of any pericardial effusion. MEDICATIONS:  She is on Lopressor 25 b.i.d., she is on Dyazide and  potassium. Zetia and Nolvadex, Paxil, Neurontin, Nexium. PAST MEDICAL HISTORY:  Hypertension, history of breast cancer, she sees  Dr. Ayan Blackwell for that. She had mastectomy done bilaterally and she was  found to have malignant neoplasm of the upper quadrant left breast  present. This is I believe recently and ductal carcinoma present. I  think she _____ at this time. She has a history of hypertension present  and breast cancer present.      PAST SURGICAL HISTORY: Back surgery, bilateral cataract surgery,  gallbladder surgery, bilateral mastectomy done. SOCIAL HISTORY:  Does not smoke. Does not drink. The patient is a  retired nurse. ALLERGIES:  CEFACLOR, CIPROFLOXACIN and CORN. Objective:   BP (!) 139/49   Pulse (!) 47   Temp 98.2 °F (36.8 °C) (Oral)   Resp 16   Ht 4' 9\" (1.448 m)   Wt 163 lb (73.9 kg)   SpO2 97%   BMI 35.27 kg/m²     Intake/Output Summary (Last 24 hours) at 8/21/2022 0535  Last data filed at 8/21/2022 0500  Gross per 24 hour   Intake 1808.39 ml   Output 1625 ml   Net 183.39 ml       Medications:   Scheduled Meds:   ezetimibe  10 mg Oral Nightly    PARoxetine  20 mg Oral Nightly    tamoxifen  20 mg Oral Nightly    gabapentin  300 mg Oral TID    sodium chloride flush  5-40 mL IntraVENous 2 times per day    enoxaparin  40 mg SubCUTAneous Daily    pantoprazole  40 mg Oral Daily      Infusions:   sodium chloride 75 mL/hr at 08/20/22 1922    DOBUTamine 5 mcg/kg/min (08/20/22 1922)    sodium chloride        PRN Meds:  ondansetron, sodium chloride flush, sodium chloride, ondansetron **OR** [DISCONTINUED] ondansetron, acetaminophen **OR** acetaminophen, hydrALAZINE     Physical Exam:  Vitals:    08/21/22 0600   BP: (!) 139/49   Pulse: (!) 47   Resp: 16   Temp:    SpO2:         General: AAO, NAD  Chest: Nontender  Cardiac: First and Second Heart Sounds are Normal, No Murmurs or Gallops noted  Lungs:Clear to auscultation and percussion. Abdomen: Soft, NT, ND, +BS  Extremities: No clubbing, no edema  Vascular:  Equal 2+ peripheral pulses.     Lab Data:  CBC:   Recent Labs     08/19/22  1019   WBC 8.4   HGB 12.6   HCT 38.6   MCV 92.6        BMP:   Recent Labs     08/19/22  1019 08/20/22  1100    143   K 4.1 3.5    109   CO2 22 22   BUN 27* 21   CREATININE 1.0 0.9     LIVER PROFILE:   Recent Labs     08/19/22  1019   AST 20   ALT 14   BILITOT 0.2   ALKPHOS 49     PT/INR:   Recent Labs     08/19/22  1019   PROTIME 11.1*   INR 0.86 APTT:   Recent Labs     08/19/22  1019   APTT 31.9     BNP:  No results for input(s): BNP in the last 72 hours.       Assessment:  Patient Active Problem List    Diagnosis Date Noted    Symptomatic bradycardia 08/19/2022    GEMMA (acute kidney injury) ([facility] 08/19/2022    Osteopenia of multiple sites 09/27/2021    Malignant neoplasm of upper-outer quadrant of left female breast ([facility] 08/14/2020    Lumbosacral radiculopathy 01/15/2016    Lumbar stenosis with neurogenic claudication 01/15/2016    Spinal stenosis, lumbar region, without neurogenic claudication 01/15/2016       Electronically signed by GABRIEL Gotti CNP on 8/21/2022 at 8:23 AM

## 2022-08-21 NOTE — FLOWSHEET NOTE
Per Bashir Chung, NP ok to trial patient off of the Dobutamine gtt. Drip paused at 1636 and restarted at [address] as pt developed some shortness of breath. Drip restarted and pt placed on 2l [place], made aware and orders received for morning labs, NPO at MN and to hold AM lovenox dose. RN to continue to monitor.

## 2022-08-22 ENCOUNTER — APPOINTMENT (OUTPATIENT)
Dept: GENERAL RADIOLOGY | Age: 77
DRG: 243 | End: 2022-08-22
Payer: COMMERCIAL

## 2022-08-22 LAB
ANION GAP SERPL CALCULATED.3IONS-SCNC: 10 MMOL/L (ref 4–16)
APTT: 27.9 SECONDS (ref 25.1–37.1)
BUN BLDV-MCNC: 18 MG/DL (ref 6–23)
CALCIUM SERPL-MCNC: 7 MG/DL (ref 8.3–10.6)
CHLORIDE BLD-SCNC: 111 MMOL/L (ref 99–110)
CO2: 22 MMOL/L (ref 21–32)
CREAT SERPL-MCNC: 0.8 MG/DL (ref 0.6–1.1)
EKG ATRIAL RATE: 52 BPM
EKG DIAGNOSIS: NORMAL
EKG P AXIS: 60 DEGREES
EKG P-R INTERVAL: 194 MS
EKG Q-T INTERVAL: 532 MS
EKG QRS DURATION: 108 MS
EKG QTC CALCULATION (BAZETT): 494 MS
EKG R AXIS: -36 DEGREES
EKG T AXIS: 43 DEGREES
EKG VENTRICULAR RATE: 52 BPM
GFR AFRICAN AMERICAN: >60 ML/MIN/1.73M2
GFR NON-AFRICAN AMERICAN: >60 ML/MIN/1.73M2
GLUCOSE BLD-MCNC: 100 MG/DL (ref 70–99)
HCT VFR BLD CALC: 29.4 % (ref 37–47)
HEMOGLOBIN: 9.3 GM/DL (ref 12.5–16)
INR BLD: 0.93 INDEX
MAGNESIUM: 1.9 MG/DL (ref 1.8–2.4)
MCH RBC QN AUTO: 30.2 PG (ref 27–31)
MCHC RBC AUTO-ENTMCNC: 31.6 % (ref 32–36)
MCV RBC AUTO: 95.5 FL (ref 78–100)
PDW BLD-RTO: 14.2 % (ref 11.7–14.9)
PHOSPHORUS: 2.7 MG/DL (ref 2.5–4.9)
PLATELET # BLD: 191 K/CU MM (ref 140–440)
PMV BLD AUTO: 10.6 FL (ref 7.5–11.1)
POTASSIUM SERPL-SCNC: 3.7 MMOL/L (ref 3.5–5.1)
PROTHROMBIN TIME: 12 SECONDS (ref 11.7–14.5)
RBC # BLD: 3.08 M/CU MM (ref 4.2–5.4)
SODIUM BLD-SCNC: 143 MMOL/L (ref 135–145)
WBC # BLD: 8.4 K/CU MM (ref 4–10.5)

## 2022-08-22 PROCEDURE — 71045 X-RAY EXAM CHEST 1 VIEW: CPT

## 2022-08-22 PROCEDURE — 2580000003 HC RX 258: Performed by: NURSE PRACTITIONER

## 2022-08-22 PROCEDURE — 85730 THROMBOPLASTIN TIME PARTIAL: CPT

## 2022-08-22 PROCEDURE — C1785 PMKR, DUAL, RATE-RESP: HCPCS

## 2022-08-22 PROCEDURE — 02HK3JZ INSERTION OF PACEMAKER LEAD INTO RIGHT VENTRICLE, PERCUTANEOUS APPROACH: ICD-10-PCS | Performed by: STUDENT IN AN ORGANIZED HEALTH CARE EDUCATION/TRAINING PROGRAM

## 2022-08-22 PROCEDURE — 2580000003 HC RX 258

## 2022-08-22 PROCEDURE — 2140000000 HC CCU INTERMEDIATE R&B

## 2022-08-22 PROCEDURE — 94761 N-INVAS EAR/PLS OXIMETRY MLT: CPT

## 2022-08-22 PROCEDURE — 6370000000 HC RX 637 (ALT 250 FOR IP): Performed by: STUDENT IN AN ORGANIZED HEALTH CARE EDUCATION/TRAINING PROGRAM

## 2022-08-22 PROCEDURE — 6370000000 HC RX 637 (ALT 250 FOR IP): Performed by: SPECIALIST

## 2022-08-22 PROCEDURE — 02H63JZ INSERTION OF PACEMAKER LEAD INTO RIGHT ATRIUM, PERCUTANEOUS APPROACH: ICD-10-PCS | Performed by: STUDENT IN AN ORGANIZED HEALTH CARE EDUCATION/TRAINING PROGRAM

## 2022-08-22 PROCEDURE — 80048 BASIC METABOLIC PNL TOTAL CA: CPT

## 2022-08-22 PROCEDURE — 2500000003 HC RX 250 WO HCPCS

## 2022-08-22 PROCEDURE — 85610 PROTHROMBIN TIME: CPT

## 2022-08-22 PROCEDURE — 33208 INSRT HEART PM ATRIAL & VENT: CPT | Performed by: INTERNAL MEDICINE

## 2022-08-22 PROCEDURE — B51N1ZZ FLUOROSCOPY OF LEFT UPPER EXTREMITY VEINS USING LOW OSMOLAR CONTRAST: ICD-10-PCS | Performed by: STUDENT IN AN ORGANIZED HEALTH CARE EDUCATION/TRAINING PROGRAM

## 2022-08-22 PROCEDURE — 6370000000 HC RX 637 (ALT 250 FOR IP): Performed by: INTERNAL MEDICINE

## 2022-08-22 PROCEDURE — 84100 ASSAY OF PHOSPHORUS: CPT

## 2022-08-22 PROCEDURE — 33208 INSRT HEART PM ATRIAL & VENT: CPT

## 2022-08-22 PROCEDURE — 0JH606Z INSERTION OF PACEMAKER, DUAL CHAMBER INTO CHEST SUBCUTANEOUS TISSUE AND FASCIA, OPEN APPROACH: ICD-10-PCS | Performed by: STUDENT IN AN ORGANIZED HEALTH CARE EDUCATION/TRAINING PROGRAM

## 2022-08-22 PROCEDURE — 93010 ELECTROCARDIOGRAM REPORT: CPT | Performed by: INTERNAL MEDICINE

## 2022-08-22 PROCEDURE — C1898 LEAD, PMKR, OTHER THAN TRANS: HCPCS

## 2022-08-22 PROCEDURE — 6370000000 HC RX 637 (ALT 250 FOR IP): Performed by: NURSE PRACTITIONER

## 2022-08-22 PROCEDURE — 85027 COMPLETE CBC AUTOMATED: CPT

## 2022-08-22 PROCEDURE — 2580000003 HC RX 258: Performed by: STUDENT IN AN ORGANIZED HEALTH CARE EDUCATION/TRAINING PROGRAM

## 2022-08-22 PROCEDURE — 6360000002 HC RX W HCPCS

## 2022-08-22 PROCEDURE — 83735 ASSAY OF MAGNESIUM: CPT

## 2022-08-22 PROCEDURE — 2580000003 HC RX 258: Performed by: INTERNAL MEDICINE

## 2022-08-22 RX ORDER — SODIUM CHLORIDE 0.9 % (FLUSH) 0.9 %
5-40 SYRINGE (ML) INJECTION PRN
Status: DISCONTINUED | OUTPATIENT
Start: 2022-08-22 | End: 2022-08-23 | Stop reason: HOSPADM

## 2022-08-22 RX ORDER — GABAPENTIN 300 MG/1
600 CAPSULE ORAL 3 TIMES DAILY
Status: DISCONTINUED | OUTPATIENT
Start: 2022-08-22 | End: 2022-08-23 | Stop reason: HOSPADM

## 2022-08-22 RX ORDER — CARVEDILOL 6.25 MG/1
12.5 TABLET ORAL 2 TIMES DAILY WITH MEALS
Status: DISCONTINUED | OUTPATIENT
Start: 2022-08-22 | End: 2022-08-23 | Stop reason: HOSPADM

## 2022-08-22 RX ORDER — SODIUM CHLORIDE 9 MG/ML
INJECTION, SOLUTION INTRAVENOUS PRN
Status: DISCONTINUED | OUTPATIENT
Start: 2022-08-22 | End: 2022-08-23 | Stop reason: HOSPADM

## 2022-08-22 RX ORDER — SODIUM CHLORIDE 0.9 % (FLUSH) 0.9 %
5-40 SYRINGE (ML) INJECTION EVERY 12 HOURS SCHEDULED
Status: DISCONTINUED | OUTPATIENT
Start: 2022-08-22 | End: 2022-08-23 | Stop reason: HOSPADM

## 2022-08-22 RX ORDER — METOPROLOL TARTRATE 50 MG/1
50 TABLET, FILM COATED ORAL 2 TIMES DAILY
Status: DISCONTINUED | OUTPATIENT
Start: 2022-08-22 | End: 2022-08-22

## 2022-08-22 RX ORDER — TRAMADOL HYDROCHLORIDE 50 MG/1
50 TABLET ORAL EVERY 6 HOURS PRN
Status: DISCONTINUED | OUTPATIENT
Start: 2022-08-22 | End: 2022-08-23 | Stop reason: HOSPADM

## 2022-08-22 RX ORDER — TRAMADOL HYDROCHLORIDE 50 MG/1
50 TABLET ORAL EVERY 6 HOURS PRN
Status: CANCELLED | OUTPATIENT
Start: 2022-08-22

## 2022-08-22 RX ORDER — ACETAMINOPHEN 325 MG/1
650 TABLET ORAL EVERY 4 HOURS PRN
Status: CANCELLED | OUTPATIENT
Start: 2022-08-22

## 2022-08-22 RX ADMIN — SODIUM CHLORIDE, PRESERVATIVE FREE 10 ML: 5 INJECTION INTRAVENOUS at 22:02

## 2022-08-22 RX ADMIN — TRAMADOL HYDROCHLORIDE 50 MG: 50 TABLET, COATED ORAL at 21:59

## 2022-08-22 RX ADMIN — PAROXETINE HYDROCHLORIDE 20 MG: 20 TABLET, FILM COATED ORAL at 22:00

## 2022-08-22 RX ADMIN — CARVEDILOL 12.5 MG: 6.25 TABLET, FILM COATED ORAL at 17:47

## 2022-08-22 RX ADMIN — EZETIMIBE 10 MG: 10 TABLET ORAL at 21:59

## 2022-08-22 RX ADMIN — GABAPENTIN 600 MG: 300 CAPSULE ORAL at 21:59

## 2022-08-22 RX ADMIN — ACETAMINOPHEN 650 MG: 325 TABLET ORAL at 11:48

## 2022-08-22 RX ADMIN — GABAPENTIN 600 MG: 300 CAPSULE ORAL at 15:14

## 2022-08-22 RX ADMIN — TRAMADOL HYDROCHLORIDE 50 MG: 50 TABLET, COATED ORAL at 15:51

## 2022-08-22 RX ADMIN — SODIUM CHLORIDE, PRESERVATIVE FREE 10 ML: 5 INJECTION INTRAVENOUS at 11:51

## 2022-08-22 RX ADMIN — SODIUM CHLORIDE: 9 INJECTION, SOLUTION INTRAVENOUS at 00:02

## 2022-08-22 RX ADMIN — CARVEDILOL 12.5 MG: 6.25 TABLET, FILM COATED ORAL at 11:49

## 2022-08-22 RX ADMIN — TAMOXIFEN CITRATE 20 MG: 10 TABLET, FILM COATED ORAL at 22:00

## 2022-08-22 ASSESSMENT — PAIN - FUNCTIONAL ASSESSMENT
PAIN_FUNCTIONAL_ASSESSMENT: ACTIVITIES ARE NOT PREVENTED

## 2022-08-22 ASSESSMENT — PAIN DESCRIPTION - ORIENTATION
ORIENTATION: LEFT
ORIENTATION: LEFT
ORIENTATION: OTHER (COMMENT)
ORIENTATION: LEFT

## 2022-08-22 ASSESSMENT — PAIN DESCRIPTION - LOCATION
LOCATION: HEAD
LOCATION: INCISION
LOCATION: CHEST
LOCATION: CHEST;INCISION

## 2022-08-22 ASSESSMENT — PAIN DESCRIPTION - DESCRIPTORS
DESCRIPTORS: ACHING
DESCRIPTORS: ACHING;THROBBING
DESCRIPTORS: THROBBING
DESCRIPTORS: ACHING

## 2022-08-22 ASSESSMENT — PAIN SCALES - WONG BAKER: WONGBAKER_NUMERICALRESPONSE: 4

## 2022-08-22 ASSESSMENT — PAIN DESCRIPTION - FREQUENCY: FREQUENCY: INTERMITTENT

## 2022-08-22 ASSESSMENT — PAIN SCALES - GENERAL
PAINLEVEL_OUTOF10: 7
PAINLEVEL_OUTOF10: 2
PAINLEVEL_OUTOF10: 5
PAINLEVEL_OUTOF10: 6
PAINLEVEL_OUTOF10: 4

## 2022-08-22 ASSESSMENT — PAIN DESCRIPTION - PAIN TYPE: TYPE: SURGICAL PAIN

## 2022-08-22 ASSESSMENT — PAIN DESCRIPTION - ONSET: ONSET: ON-GOING

## 2022-08-22 NOTE — OP NOTE
assess the patency of the subclavian vein and for access under fluoroscopic guidance. An incision was made in the left pectoral area after administration of lidocaine. Using electrocautery and blunt dissection, a pocket was created. Central venous access into the left subclavian vein was obtained using the modified Seldinger technique. After central venous access was obtained, a sheath was placed in the vein. A right ventricular lead was advanced into position in the RV septum  under fluoroscopic guidance and using a series of curved stylets. The lead was actively fixated. After confirming appropriate function, the sheath was split and removed. The lead was secured to the underlying tissue using suture material. A new sheath was advanced over a second previously placed wire into the vein. The atrial lead was advanced to the right atrial appendage and actively fixated under fluoroscopic guidance. After confirming appropriate function, the sheath was split and removed. The lead was secured to the underlying tissue using suture. The pocket was irrigated with an antibiotic solution. The leads were then connected to the new pulse generator which was then placed into the cleaned pocket. The pocket was then closed in two separate subcutaneous layers using 2-0 & 2-0 Vicryl and subcuticular layer using 4-0 Vicryl. The skin was covered with Steri-Strips and dressing. All sponge and needle counts were reported as correct at the end of the procedure. The patient tolerated the procedure well and there were no complications. Patient was transported to the holding area in stable condition. Lead and device information:                 Plan:     The patient will be in observation status and have usual post-implant care, including chest x-ray, and antibiotic therapy and interrogation of the device.       Electronically signed by Sonjia Cooks, MD on 8/22/2022 at 10:40 AM

## 2022-08-22 NOTE — PROGRESS NOTES
Admit Date:  8/19/2022    Admission diagnosis / Complaint :  Symptomatic bradycardia      Subjective:  Ms. Diya Barlow reports her HR was down when dobutamine was decreased and she was symptomatic   Patient is currently on dobutamine still    Objective:   BP (!) 147/44   Pulse 50   Temp 98.5 °F (36.9 °C) (Oral)   Resp 16   Ht 4' 9\" (1.448 m)   Wt 163 lb (73.9 kg)   SpO2 96%   BMI 35.27 kg/m²     Intake/Output Summary (Last 24 hours) at 8/22/2022 5329  Last data filed at 8/22/2022 0708  Gross per 24 hour   Intake 2102.76 ml   Output 1825 ml   Net 277.76 ml       TELEMETRY: Sinus, 2:1 and some times 3:1 block with ventricular bradycardia   has a past medical history of Arthritis, CAD (coronary artery disease), Cancer ([facility] [address] Hyperlipidemia, and Hypertension. has a past surgical history that includes back surgery; Hysterectomy; Cholecystectomy; Breast surgery (Left); and Mastectomy, bilateral (Bilateral, 10/19/2018).   Physical Exam:  General:  Awake, alert, NAD  Skin:  Warm and dry  Neck:  JVD none  Chest:  Clear to auscultation, respiration easy  Cardiovascular:  regular, bradycardia  Abdomen:  Soft non tender  Extremities:  no edema    Medications:    ezetimibe  10 mg Oral Nightly    PARoxetine  20 mg Oral Nightly    tamoxifen  20 mg Oral Nightly    gabapentin  300 mg Oral TID    sodium chloride flush  5-40 mL IntraVENous 2 times per day    [Held by provider] enoxaparin  40 mg SubCUTAneous Daily    pantoprazole  40 mg Oral Daily      sodium chloride 75 mL/hr at 08/22/22 0708    DOBUTamine 5 mcg/kg/min (08/22/22 0708)    sodium chloride       ALPRAZolam, ondansetron, sodium chloride flush, sodium chloride, ondansetron **OR** [DISCONTINUED] ondansetron, acetaminophen **OR** acetaminophen, hydrALAZINE    Lab Data:  CBC:   Recent Labs     08/19/22  1019 08/22/22  0410   WBC 8.4 8.4   HGB 12.6 9.3*   HCT 38.6 29.4*   MCV 92.6 95.5    191     BMP:   Recent Labs     08/20/22  1100 08/21/22  1350 08/22/22  0410  141 143   K 3.5 4.1 3.7    108 111*   CO2 22 20* 22   PHOS  --   --  2.7   BUN 21 17 18   CREATININE 0.9 0.9 0.8     LIVER PROFILE:   Recent Labs     08/19/22  1019   AST 20   ALT 14   BILITOT 0.2   ALKPHOS 49     PT/INR:   Recent Labs     08/19/22  1019 08/22/22  0410   PROTIME 11.1* 12.0   INR 0.86 0.93     APTT:   Recent Labs     08/19/22  1019 08/22/22  0410   APTT 31.9 27.9     BNP:  No results for input(s): BNP in the last 72 hours. Assessment:    2 to 1 AV block and at times 3:1 AV block  Bifascicular block now probably trifascicular block with a 2-1 block  History of breast cancer  Hypertension    Still bradycardic and unable to tolerate weaning of dobutamine with significant bradycardia with it. Patient also having at intermittent AV block  - this could be from increase in sinus rate with dobutamine with variable block vs 3rd degree. Bifascicular block at baseline  previously and now trifascicular and now having a 3-1 block with ventricular bradycardia - I am little concerned about having high-grade AV block so it would be reasonable for us to proceed with pacemaker    The risks including, but not limited to, the risks of vascular injury, bleeding, infection, device malfunction, lead dislodgement, radiation exposure, injury to cardiac and surrounding structures (including pneumothorax), stroke, myocardial infarction and death were discussed in detail. The patient opted to proceed with the device implantation. Double mastectomy and appears to be having left-sided mastectomy with radiation on the left side. Discussed with the patient that it would be reasonable to do on the side where there is chance of malignancy may repeat so discussed with the patient and gave her option either left or right-sided.   I would have preferred to go left-sided because of the previous treatments on left side and complete mastectomy with lymph node removal and there is a less chance of recurrence on the left side. Needs radiation and then devise needs to move to the other side so discussed with the patient about all the pros and cons about it and we decided to placed on the left side. Patient was worried about edema on left side with pacemaker and I told her it depends how  her body reacts to the pacemaker leads in the subclavian and if she  has a subclavian stenosis  she could have edema - irrespective of which side we place the device. Patient voiced understanding. We decided to place the device on left side as she is also right handed. Caterina Cole MD, 8/22/2022 9:16 AM     Please note this report has been partially produced using speech recognition software and may contain errors related to that system including errors in grammar, punctuation, and spelling, as well as words and phrases that may be inappropriate. If there are any questions or concerns please feel free to contact the dictating provider for clarification.

## 2022-08-22 NOTE — PROGRESS NOTES
V2.0  [facility] Hospitalist Progress Note      Name:  Stefani Sosa /Age/Sex: 1945  (64 y.o. female)   MRN & CSN:  [id number] & [id number] Encounter Date/Time: 2022 2:39 PM EDT    Location:  -A PCP: Tonya Lin MD       Hospital Day: 4    Assessment and Plan:   Stefani Sosa is a 64 y.o. female with a PMHx of  hyperlipidemia Breast cancer s/p mastectomies and hypertension who presents with Symptomatic bradycardia. *High grade symptomatic Bradycardia (2:1 AV block) s/p PPM [employer]  *Chronic Bifasicular block  Site looks clean dry and intact  CXR reviewed personally, wires in appropriate position  EP and cardiology following, evaluation for pacemaker  Discussed with cardiology, want to monitor for 24h on telemetry and possible discharge tomorrow. Obesity Class I  Counseled regarding weight loss    Other chronic medical conditions include:   GERD: Continue protonix  Hypertension: can utilize hydralazine prn for SBP >160  Breast cancer s/p mastectomy  Neuropathy: continue gabapentin  Osteoporosis- continue Ca supplementation  Hyperlipidemia- continue zetia  Post Menopausal syndrome- continue tamoxifen    Will downgrade to Telemetry floor. Diet ADULT DIET; Regular   DVT Prophylaxis [x] Lovenox, []  Heparin, [] SCDs, [] Ambulation,  [] Eliquis, [] Xarelto  [] Coumadin   Code Status Limited   Disposition From: Home  Expected Disposition: Home  Estimated Date of Discharge: 1-2 days  Patient requires continued admission due to evaluation for pacemaker   Surrogate Decision Maker/ POA Brother Theresa     Subjective:     Chief Complaint: Bradycardia (HR in 40s; sent from [facility] Cardiology office)       Stefani Sosa is a 64 y.o. female who presents with symptomatic bradycardia. Patient was seen and evaluated at bedside. Patient was resting comfortably post procedure. HR >80. Hemodynamically stable. Objective:      Intake/Output Summary (Last 24 hours) at 2022 1350  Last data filed at 8/22/2022 0708  Gross per 24 hour   Intake 2102.76 ml   Output 1825 ml   Net 277.76 ml        Vitals:   Vitals:    08/22/22 1127   BP:    Pulse: 83   Resp: 15   Temp:    SpO2: 96%       Physical Exam:   Physical Exam  Vitals reviewed. Constitutional:       Appearance: Normal appearance. She is normal weight. HENT:      Head: Normocephalic. Nose: Nose normal.      Mouth/Throat:      Mouth: Mucous membranes are moist.   Eyes:      Conjunctiva/sclera: Conjunctivae normal.      Pupils: Pupils are equal, round, and reactive to light. Cardiovascular:      Rate and Rhythm: Normal rate and regular rhythm. Pulses: Normal pulses. Heart sounds: Normal heart sounds. No murmur heard. Comments: Paced rhythm  Pulmonary:      Effort: Pulmonary effort is normal.      Breath sounds: Normal breath sounds. No wheezing, rhonchi or rales. Abdominal:      General: Abdomen is flat. Bowel sounds are normal. There is no distension. Palpations: Abdomen is soft. Tenderness: There is no abdominal tenderness. Musculoskeletal:         General: No deformity. Normal range of motion. Cervical back: Normal range of motion and neck supple. Right lower leg: No edema. Left lower leg: No edema. Skin:     Coloration: Skin is not jaundiced or pale. Comments: PPM site looks clean dry and intact   Neurological:      General: No focal deficit present. Mental Status: She is alert and oriented to person, place, and time. Mental status is at baseline.           Medications:   Medications:    carvedilol  12.5 mg Oral BID WC    sodium chloride flush  5-40 mL IntraVENous 2 times per day    vancomycin  1,000 mg IntraVENous Q12H    ezetimibe  10 mg Oral Nightly    PARoxetine  20 mg Oral Nightly    tamoxifen  20 mg Oral Nightly    gabapentin  300 mg Oral TID    sodium chloride flush  5-40 mL IntraVENous 2 times per day    [Held by provider] enoxaparin  40 mg SubCUTAneous Daily    pantoprazole 40 mg Oral Daily      Infusions:    sodium chloride      sodium chloride 75 mL/hr at 08/22/22 0708    sodium chloride       PRN Meds: sodium chloride flush, 5-40 mL, PRN  sodium chloride, , PRN  traMADol, 50 mg, Q6H PRN  ALPRAZolam, 0.5 mg, Nightly PRN  ondansetron, 4 mg, Q30 Min PRN  sodium chloride flush, 5-40 mL, PRN  sodium chloride, , PRN  ondansetron, 4 mg, Q8H PRN  acetaminophen, 650 mg, Q6H PRN   Or  acetaminophen, 650 mg, Q6H PRN  hydrALAZINE, 10 mg, Q4H PRN      Labs      Recent Results (from the past 24 hour(s))   CBC    Collection Time: 08/22/22  4:10 AM   Result Value Ref Range    WBC 8.4 4.0 - 10.5 K/CU MM    RBC 3.08 (L) 4.2 - 5.4 M/CU MM    Hemoglobin 9.3 (L) 12.5 - 16.0 GM/DL    Hematocrit 29.4 (L) 37 - 47 %    MCV 95.5 78 - 100 FL    MCH 30.2 27 - 31 PG    MCHC 31.6 (L) 32.0 - 36.0 %    RDW 14.2 11.7 - 14.9 %    Platelets 784 415 - 117 K/CU MM    MPV 10.6 7.5 - 11.1 FL   Basic Metabolic Panel    Collection Time: 08/22/22  4:10 AM   Result Value Ref Range    Sodium 143 135 - 145 MMOL/L    Potassium 3.7 3.5 - 5.1 MMOL/L    Chloride 111 (H) 99 - 110 mMol/L    CO2 22 21 - 32 MMOL/L    Anion Gap 10 4 - 16    BUN 18 6 - 23 MG/DL    Creatinine 0.8 0.6 - 1.1 MG/DL    Glucose 100 (H) 70 - 99 MG/DL    Calcium 7.0 (L) 8.3 - 10.6 MG/DL    GFR Non-African American >60 >60 mL/min/1.73m2    GFR African American >60 >60 mL/min/1.73m2   Magnesium    Collection Time: 08/22/22  4:10 AM   Result Value Ref Range    Magnesium 1.9 1.8 - 2.4 mg/dl   Phosphorus    Collection Time: 08/22/22  4:10 AM   Result Value Ref Range    Phosphorus 2.7 2.5 - 4.9 MG/DL   Protime/INR & PTT    Collection Time: 08/22/22  4:10 AM   Result Value Ref Range    Protime 12.0 11.7 - 14.5 SECONDS    INR 0.93 INDEX    aPTT 27.9 25.1 - 37.1 SECONDS          Imaging/Diagnostics Last 24 Hours   Echocardiogram complete 2D with doppler with color    Result Date: 8/19/2022  Transthoracic Echocardiography Report (TTE)  Demographics   Patient Name JAIME CASE    Date of Study       08/19/2022   Date of Birth      1945        Gender              Female   Age                64 year(s)        Race                   Patient Number     [id number]        Room Number         ED27   Visit Number       [id number]   Corporate ID       [id number]   Accession Number   [id number]        ANIL Guaman RVT   Ordering Physician Maxine Vazquez MD  Interpreting        Maxine Vazquez MD                                       Physician  Procedure Type of Study   TTE procedure:ECHOCARDIOGRAM COMPLETE 2D W DOPPLER W COLOR. Procedure Date Date: 08/19/2022 Start: 11:18 AM Study Location: Portable Technical Quality: Adequate visualization Indications:Chest pain. Patient Status: Routine Height: 57 inches Weight: 163 pounds BSA: 1.65 m2 BMI: 35.27 kg/m2 HR: 42 bpm BP: 163/60 mmHg  Conclusions   Summary  Left ventricular systolic function is normal.  Ejection fraction is visually estimated at 50-55%. No significant valvular disease noted. No evidence of any pericardial effusion. Signature   ------------------------------------------------------------------  Electronically signed by Maxine Vazquez MD (Interpreting  physician) on 08/19/2022 at 11:52 AM  ------------------------------------------------------------------   Findings   Left Ventricle  Left ventricular systolic function is normal.  Ejection fraction is visually estimated at 50-55%. No regional wall motion abnormalites. Left ventricle size is normal.  Normal diastolic function. Left Atrium  Essentially normal left atrium. Right Atrium  Essentially normal right atrium. Right Ventricle  Essentially normal right ventricle. Aortic Valve  Structurally normal aortic valve.    Mitral Valve  Mild mitral regurgitation. Tricuspid Valve  No evidence of tricuspid regurgitation. Pulmonic Valve  The pulmonic valve was not well visualized. Pericardial Effusion  No evidence of any pericardial effusion. Pleural Effusion  No evidence of pleural effusion. Miscellaneous  IVC and abdominal aorta are within normal limits.   M-Mode/2D Measurements & Calculations   LV Diastolic Dimension:  LV Systolic Dimension:  LA Dimension: 3.5 cmAO Root  4.54 cm                  2.77 cm                 Dimension: 2.8 cmLA Area:  LV FS:39 %               LV Volume Diastolic: 66 22.4 cm2  LV PW Diastolic: 1.25 cm ml  LV PW Systolic: 0.93 cm  LV Volume Systolic: 29  Septum Diastolic: 9.65   ml  cm                       LV EDV/LV EDV Index: 66 RV Diastolic Dimension:  Septum Systolic: 4.01 cm TOLBERT/61 S0CP ESV/LV ESV   2.74 cm  CO: 3.19 l/min           Index: 29 ml/18 m2  CI: 1.93 l/m*m2          EF Calculated (A4C):    LA/Aorta: 1.25                           56.1 %  LV Area Diastolic: 16.9  EF Calculated (2D):     LA volume/Index: 46 ml  cm2                      69.5 %                  /86Q1  LV Area Systolic: 15 cm2                           LV Length: 7.46 cm                            LVOT: 1.9 cm  Doppler Measurements & Calculations   MV Peak E-Wave: 137     AV Peak Velocity: 126 cm/s   LVOT Peak Velocity: 114  cm/s                    AV Peak Gradient: 6.35 mmHg  cm/s  MV Peak A-Wave: 103     AV Mean Velocity: 85.3 cm/s  LVOT Mean Velocity:  cm/s                    AV Mean Gradient: 3 mmHg     78.6 cm/s  MV E/A Ratio: 1.33      AV VTI: 29.1 cm              LVOT Peak Gradient: 5  MV Peak Gradient: 7.51  AV Area (Continuity):2.61    mmHgLVOT Mean Gradient:  mmHg                    cm2                          3 mmHg   MV P1/2t: 48 msec       LVOT VTI: 26.8 cm  MVA by PHT:4.58 cm2   MV E' Septal Velocity:  14.9 cm/s  MV E' Lateral Velocity:  12.6 cm/s  MV E/E' septal: 9.19  MV E/E' lateral: 10.87      XR CHEST PORTABLE    Result Date: 8/19/2022  EXAMINATION: ONE XRAY VIEW OF THE CHEST 8/19/2022 11:06 am COMPARISON: 10/15/2018 HISTORY: ORDERING SYSTEM PROVIDED HISTORY: dizziness TECHNOLOGIST PROVIDED HISTORY: Reason for exam:->dizziness Reason for Exam: dizziness FINDINGS: The lungs are without acute focal process. There is no effusion or pneumothorax. The cardiomediastinal silhouette is without acute process. The osseous structures are without acute process. No acute process.        Electronically signed by Ellis Tan MD on 8/22/2022 at 1:50 PM

## 2022-08-22 NOTE — DISCHARGE INSTRUCTIONS
1. Avoid raising the arm above shoulder for 4 weeks  2. No driving for 10 days  3. No lifting more than 10 lbs with the left hand  4. Do not wet the area of surgery for 10 days  5. Follow up appointment with Doctor for wound check in 10 days  6. Notify Doctor if pain, fever, chills, swelling or bleeding in the surgical area  7. Please avoid sleeping on the surgical side. 8. Please do not allow anyone other than Dr Saud Asher staff to remove or redress the surgical site. If the dressing were to fall off or fall partially off before the 10 day follow up appointment, please call the office ASAP.

## 2022-08-22 NOTE — PROGRESS NOTES
(Last 24 hours) at 8/22/2022 0957  Last data filed at 8/22/2022 0708  Gross per 24 hour   Intake 2102.76 ml   Output 1825 ml   Net 277.76 ml       Medications:   Scheduled Meds:   ezetimibe  10 mg Oral Nightly    PARoxetine  20 mg Oral Nightly    tamoxifen  20 mg Oral Nightly    gabapentin  300 mg Oral TID    sodium chloride flush  5-40 mL IntraVENous 2 times per day    [Held by provider] enoxaparin  40 mg SubCUTAneous Daily    pantoprazole  40 mg Oral Daily      Infusions:   sodium chloride 75 mL/hr at 08/22/22 0708    DOBUTamine 5 mcg/kg/min (08/22/22 0708)    sodium chloride        PRN Meds:  ALPRAZolam, ondansetron, sodium chloride flush, sodium chloride, ondansetron **OR** [DISCONTINUED] ondansetron, acetaminophen **OR** acetaminophen, hydrALAZINE     Physical Exam:  Vitals:    08/22/22 0700   BP: (!) 147/44   Pulse: 50   Resp: 16   Temp:    SpO2: 96%        Lab Data:  CBC:   Recent Labs     08/19/22  1019 08/22/22  0410   WBC 8.4 8.4   HGB 12.6 9.3*   HCT 38.6 29.4*   MCV 92.6 95.5    191     BMP:   Recent Labs     08/20/22  1100 08/21/22  1350 08/22/22  0410    141 143   K 3.5 4.1 3.7    108 111*   CO2 22 20* 22   PHOS  --   --  2.7   BUN 21 17 18   CREATININE 0.9 0.9 0.8     LIVER PROFILE:   Recent Labs     08/19/22  1019   AST 20   ALT 14   BILITOT 0.2   ALKPHOS 49     PT/INR:   Recent Labs     08/19/22  1019 08/22/22  0410   PROTIME 11.1* 12.0   INR 0.86 0.93     APTT:   Recent Labs     08/19/22  1019 08/22/22  0410   APTT 31.9 27.9     BNP:  No results for input(s): BNP in the last 72 hours.       Assessment:  Patient Active Problem List    Diagnosis Date Noted    Symptomatic bradycardia 08/19/2022    GEMMA (acute kidney injury) ([facility] [address] 08/19/2022    Osteopenia of multiple sites 09/27/2021    Malignant neoplasm of upper-outer quadrant of left female breast ([facility] [address] 08/14/2020    Lumbosacral radiculopathy 01/15/2016    Lumbar stenosis with neurogenic claudication 01/15/2016    Spinal stenosis, lumbar region, without neurogenic claudication 01/15/2016       Electronically signed by GABRIEL Cotton - CNP on 8/22/2022 at 9:57 AM     Electronically signed by Sergio Hood MD on 8/22/22 at 11:17 AM EDT

## 2022-08-23 VITALS
SYSTOLIC BLOOD PRESSURE: 126 MMHG | RESPIRATION RATE: 16 BRPM | BODY MASS INDEX: 40.82 KG/M2 | TEMPERATURE: 98.6 F | OXYGEN SATURATION: 92 % | HEIGHT: 57 IN | WEIGHT: 189.2 LBS | DIASTOLIC BLOOD PRESSURE: 55 MMHG | HEART RATE: 81 BPM

## 2022-08-23 PROCEDURE — 6360000002 HC RX W HCPCS: Performed by: NURSE PRACTITIONER

## 2022-08-23 PROCEDURE — 6370000000 HC RX 637 (ALT 250 FOR IP): Performed by: INTERNAL MEDICINE

## 2022-08-23 PROCEDURE — 2580000003 HC RX 258: Performed by: NURSE PRACTITIONER

## 2022-08-23 PROCEDURE — C1769 GUIDE WIRE: HCPCS

## 2022-08-23 PROCEDURE — 6370000000 HC RX 637 (ALT 250 FOR IP): Performed by: SPECIALIST

## 2022-08-23 PROCEDURE — 6370000000 HC RX 637 (ALT 250 FOR IP): Performed by: STUDENT IN AN ORGANIZED HEALTH CARE EDUCATION/TRAINING PROGRAM

## 2022-08-23 RX ORDER — CARVEDILOL 12.5 MG/1
12.5 TABLET ORAL 2 TIMES DAILY WITH MEALS
Qty: 60 TABLET | Refills: 0 | Status: SHIPPED | OUTPATIENT
Start: 2022-08-23 | End: 2022-09-22

## 2022-08-23 RX ADMIN — GABAPENTIN 600 MG: 300 CAPSULE ORAL at 08:47

## 2022-08-23 RX ADMIN — PANTOPRAZOLE SODIUM 40 MG: 40 TABLET, DELAYED RELEASE ORAL at 08:47

## 2022-08-23 RX ADMIN — VANCOMYCIN HYDROCHLORIDE 1000 MG: 1 INJECTION, POWDER, LYOPHILIZED, FOR SOLUTION INTRAVENOUS at 00:28

## 2022-08-23 RX ADMIN — CARVEDILOL 12.5 MG: 6.25 TABLET, FILM COATED ORAL at 08:47

## 2022-08-23 ASSESSMENT — PAIN SCALES - GENERAL: PAINLEVEL_OUTOF10: 4

## 2022-08-23 ASSESSMENT — PAIN SCALES - WONG BAKER: WONGBAKER_NUMERICALRESPONSE: 4

## 2022-08-23 ASSESSMENT — PAIN DESCRIPTION - LOCATION: LOCATION: CHEST

## 2022-08-23 NOTE — DISCHARGE INSTR - DIET

## 2022-08-23 NOTE — DISCHARGE INSTR - COC
Continuity of Care Form    Patient Name: Rubi Oliveira   :  [birth year]  MRN:  [id number]    Admit date:  2022  Discharge date:  ***    Code Status Order: Limited   Advance Directives:     Admitting Physician:  Martha Fontenot MD  PCP: Gladys Paulson MD    Discharging Nurse: [facility] Unit/Room#: 8352/5161-I  Discharging Unit Phone Number: ***    Emergency Contact:   Extended Emergency Contact Information  Primary Emergency Contact: Theresa Boston   [address] Phone: [phone]  Relation: Brother/Sister    Past Surgical History:  Past Surgical History:   Procedure Laterality Date    BACK SURGERY      BREAST SURGERY Left     lesion removed     CHOLECYSTECTOMY      HYSTERECTOMY (CERVIX STATUS UNKNOWN)      MASTECTOMY, BILATERAL Bilateral 10/19/2018    PACEMAKER INSERTION Left 2022    [employer]tronic Delfina XT DR MICHAEL Mohan PPM       Immunization History:   Immunization History   Administered Date(s) Administered     Influenza, FLUZONE (age 72 y+), High Dose 2020, 10/02/2021    COVID-19, MODERNA BLUE border, Primary or Immunocompromised, (age 12y+), IM, 100 mcg/0.5mL 2021, 2021    COVID-19, [place] Vaccine, Vaccine Unspecified 10/27/2021    Influenza A (F9Y1-57) Vaccine PF IM 2009    Influenza A (H4u1-89),all Formulations 2009    Influenza Virus Vaccine 10/28/2013, 2014, 2014, 2015, 10/27/2016, 10/27/2016    Influenza, High Dose (Fluzone 65 yrs and older) 2019    Pneumococcal Conjugate 13-valent (Epsyfkm30) 2019    Pneumococcal Polysaccharide (Llunfrpla12) 2012    Tdap (Boostrix, Adacel) 2012, 2019, 2019    Zoster Live (Zostavax) 2012    Zoster Recombinant (Shingrix) 2019, 2019       Active Problems:  Patient Active Problem List   Diagnosis Code    Lumbosacral radiculopathy M54.17    Lumbar stenosis with neurogenic claudication M48.062    Spinal stenosis, lumbar region, without neurogenic claudication M48.061    Malignant neoplasm of upper-outer quadrant of left female breast (HCC) C50.412    Osteopenia of multiple sites M85.89    Symptomatic bradycardia R00.1    GEMMA (acute kidney injury) ([facility] [address] N17.9       Isolation/Infection:   Isolation            No Isolation          Patient Infection Status       None to display            Nurse Assessment:  Last Vital Signs: BP (!) 126/55   Pulse 81   Temp 98.6 °F (37 °C) (Oral)   Resp 16   Ht 4' 9\" (1.448 m)   Wt 189 lb 3.2 oz (85.8 kg)   SpO2 92%   BMI 40.94 kg/m²     Last documented pain score (0-10 scale): Pain Level: 4  Last Weight:   Wt Readings from Last 1 Encounters:   08/23/22 189 lb 3.2 oz (85.8 kg)     Mental Status:  {IP PT MENTAL STATUS:20030}    IV Access:  { JAREN IV ACCESS:867851891}    Nursing Mobility/ADLs:  Walking   {[facility] DME KUGT:370199435}  Transfer  {[facility] DME ASNF:489702284}  Bathing  {[facility] DME EARW:767733776}  Dressing  {[facility] DME NORK:827182181}  Toileting  {[facility] DME RPHC:551838852}  Feeding  {[facility] DME LDWL:828368603}  Med Admin  {[facility] DME YGVA:509415976}  Med Delivery   { JAREN MED Delivery:011448967}    Wound Care Documentation and Therapy:        Elimination:  Continence: Bowel: {YES / LP:60387}  Bladder: {YES / OW:71852}  Urinary Catheter: {Urinary Catheter:014988968}   Colostomy/Ileostomy/Ileal Conduit: {YES / LR:08108}       Date of Last BM: ***    Intake/Output Summary (Last 24 hours) at 8/23/2022 1249  Last data filed at 8/23/2022 0442  Gross per 24 hour   Intake 400 ml   Output 850 ml   Net -450 ml     I/O last 3 completed shifts: In: 1397.2 [P.O.:400;  I.V.:997.2]  Out: 1425 [Urine:1425]    Safety Concerns:     [facility] Safety Concerns:881596753}    Impairments/Disabilities:      {MH JAREN Impairments/Disabilities:773670718}    Nutrition Therapy:  Current Nutrition Therapy:   [address] JAREN Diet List:835896816}    Routes of Feeding: {CHP DME Other Feedings:250679418}  Liquids: {Slp liquid thickness:64699}  Daily Fluid Restriction: {CHP DME Yes amt example:677043169}  Last Modified Barium Swallow with Video (Video Swallowing Test): {Done [facility]:203488647}    Treatments at the Time of Hospital Discharge:   Respiratory Treatments: ***  Oxygen Therapy:  {Therapy; copd oxygen:58892}  Ventilator:    {MH CC Vent QSIE:607066881}    Rehab Therapies: {THERAPEUTIC INTERVENTION:3079277496}  Weight Bearing Status/Restrictions: 50Vishal Carson CC Weight Bearin}  Other Medical Equipment (for information only, NOT a DME order):  {EQUIPMENT:286280337}  Other Treatments: ***    Patient's personal belongings (please select all that are sent with patient):  {CHP DME Belongings:442861862}    RN SIGNATURE:  {Esignature:703371554}    CASE MANAGEMENT/SOCIAL WORK SECTION    Inpatient Status Date: ***    Readmission Risk Assessment Score:  Readmission Risk              Risk of Unplanned Readmission:  17           Discharging to Facility/ Agency   Name:   Address:  Phone:  Fax:    Dialysis Facility (if applicable)   Name:  Address:  Dialysis Schedule:  Phone:  Fax:    / signature: {Esignature:056334066}    PHYSICIAN SECTION    Prognosis: {Prognosis:3990924921}    Condition at Discharge: 50Vishal Carson Patient Condition:034372712}    Rehab Potential (if transferring to Rehab): {Prognosis:7212801254}    Recommended Labs or Other Treatments After Discharge: ***    Physician Certification: I certify the above information and transfer of Ren Wu  is necessary for the continuing treatment of the diagnosis listed and that she requires {Admit to Appropriate Level of Care:86261} for {GREATER/LESS:073927032} 30 days.      Update Admission H&P: {CHP DME Changes in JYYRA:011710347}    PHYSICIAN SIGNATURE:  {Esignature:944281379}

## 2022-08-23 NOTE — PROGRESS NOTES
Daily Progress Note  Subjective:  Awake and alert; feeling well  Denies any CP or SOB  V paced on tele; HR controlled  BP stable  Pacer site stable  Okay to d/c from cardiac standpoint  F/u in the office in 1-2 weeks    Attending Note:  Feeling better  Ok to d/c home from cardiac stand   Pacer check and pacer site is stable  She is v paced  Keep on coreg on d/c  F/u as outpatient     Impression and Plan:   Bradycardia    D/t 2:1 AV block; Occasional 3:1 block present as well    Was previously on metoprolol, has been on hold    EP has been consulted    PPM was inserted yesterday; no complications    Site healing well; Stable from EP standpoint    Vpaced in the 80's this AM     Hx of HTN; remaining stable     Will cont' to follow     Most Recent Echo   8/19/2022 Summary   Left ventricular systolic function is normal.   Ejection fraction is visually estimated at 50-55%. No significant valvular disease noted. No evidence of any pericardial effusion. MEDICATIONS:  She is on Lopressor 25 b.i.d., she is on Dyazide and  potassium. Zetia and Nolvadex, Paxil, Neurontin, Nexium. PAST MEDICAL HISTORY:  Hypertension, history of breast cancer, she sees  Dr. Silverman Officer for that. She had mastectomy done bilaterally and she was  found to have malignant neoplasm of the upper quadrant left breast  present. This is I believe recently and ductal carcinoma present. I  think she _____ at this time. She has a history of hypertension present  and breast cancer present. PAST SURGICAL HISTORY:  Back surgery, bilateral cataract surgery,  gallbladder surgery, bilateral mastectomy done. SOCIAL HISTORY:  Does not smoke. Does not drink. The patient is a  retired nurse. ALLERGIES:  CEFACLOR, CIPROFLOXACIN and CORN.   Objective:   BP (!) 126/55   Pulse 81   Temp 98.6 °F (37 °C) (Oral)   Resp 16   Ht 4' 9\" (1.448 m)   Wt 189 lb 3.2 oz (85.8 kg)   SpO2 92%   BMI 40.94 kg/m²     Intake/Output Summary (Last 24 hours) at 8/23/2022 0920  Last data filed at 8/23/2022 0442  Gross per 24 hour   Intake 400 ml   Output 850 ml   Net -450 ml       Medications:   Scheduled Meds:   carvedilol  12.5 mg Oral BID WC    sodium chloride flush  5-40 mL IntraVENous 2 times per day    gabapentin  600 mg Oral TID    ezetimibe  10 mg Oral Nightly    PARoxetine  20 mg Oral Nightly    tamoxifen  20 mg Oral Nightly    sodium chloride flush  5-40 mL IntraVENous 2 times per day    [Held by provider] enoxaparin  40 mg SubCUTAneous Daily    pantoprazole  40 mg Oral Daily      Infusions:   sodium chloride      sodium chloride        PRN Meds:  sodium chloride flush, sodium chloride, traMADol, ALPRAZolam, ondansetron, sodium chloride flush, sodium chloride, ondansetron **OR** [DISCONTINUED] ondansetron, acetaminophen **OR** acetaminophen, hydrALAZINE     Physical Exam:  Vitals:    08/23/22 0600   BP: (!) 126/55   Pulse: 81   Resp: 16   Temp:    SpO2: 92%        General: AAO, NAD  Chest: Nontender  Cardiac: First and Second Heart Sounds are Normal, No Murmurs or Gallops noted  Lungs:Clear to auscultation and percussion. Abdomen: Soft, NT, ND, +BS  Extremities: No clubbing, no edema  Vascular:  Equal 2+ peripheral pulses. Lab Data:  CBC:   Recent Labs     08/22/22  0410   WBC 8.4   HGB 9.3*   HCT 29.4*   MCV 95.5        BMP:   Recent Labs     08/20/22  1100 08/21/22  1350 08/22/22  0410    141 143   K 3.5 4.1 3.7    108 111*   CO2 22 20* 22   PHOS  --   --  2.7   BUN 21 17 18   CREATININE 0.9 0.9 0.8     LIVER PROFILE: No results for input(s): AST, ALT, LIPASE, BILIDIR, BILITOT, ALKPHOS in the last 72 hours. Invalid input(s): AMYLASE,  ALB  PT/INR:   Recent Labs     08/22/22  0410   PROTIME 12.0   INR 0.93     APTT:   Recent Labs     08/22/22 0410   APTT 27.9     BNP:  No results for input(s): BNP in the last 72 hours.       Assessment:  Patient Active Problem List    Diagnosis Date Noted    Symptomatic bradycardia 08/19/2022    GEMMA

## 2022-08-23 NOTE — PROGRESS NOTES
Patient left upper chest site no hematoma, no tenderness. Minimal bruise  CXR  - no acute abnormatities  Device interrogation - with in limits    Can be discharged with an outpatient follow up in 10 days.

## 2022-08-24 NOTE — DISCHARGE SUMMARY
V2.0  Discharge Summary    Name:  Rubi Oliveira /Age/Sex: 1945 (29 y.o. female)   Admit Date: 2022  Discharge Date: 22    MRN & CSN:  [id number] & [id number] Encounter Date and Time 22 10:09 PM EDT    Attending:  No att. providers found Discharging Provider: Beth Blunt MD       Hospital Course:     Brief HPI: Rubi Oliveira is a 64 y.o. female who presented with Dizziness    Brief Problem Based Course:     High grade symptomatic Bradycardia (2:1 AV block). s/p PPM Medtronic inserted on 22 - Chronic Bifasicular block. Site looks clean dry and intact. CXR reviewed personally, wires in appropriate position. Patient remain stable for 24 hrs. No major events. DC with cardiology and epi f/u as OP      Other chronic medical conditions include:   GERD: Continue protonix  Hypertension: can utilize hydralazine prn for SBP >160  Breast cancer s/p mastectomy  Neuropathy: continue gabapentin  Osteoporosis- continue Ca supplementation  Hyperlipidemia- continue zetia  Post Menopausal syndrome- continue tamoxifen    The patient expressed appropriate understanding of, and agreement with the discharge recommendations, medications, and plan.      Consults this admission:  IP CONSULT TO CARDIOLOGY  IP CONSULT TO HOSPITALIST  IP CONSULT TO CARDIOLOGY    Discharge Diagnosis:   Symptomatic bradycardia        Discharge Instruction:   Follow up appointments: cardiology  Primary care physician: Gladys Paulson MD within 2 weeks  Diet: cardiac diet   Activity: activity as tolerated  Disposition: Discharged to:   [x]Home, [][facility], []SNF, []Acute Rehab, []Hospice   Condition on discharge: Stable  Labs and Tests to be Followed up as an outpatient by PCP or Specialist: Cardiologist in 1 week    Discharge Medications:        Medication List        START taking these medications      carvedilol 12.5 MG tablet  Commonly known as: COREG  Take 1 tablet by mouth 2 times daily (with meals)            CONTINUE taking these medications      CALCIUM 1200 PO     esomeprazole Magnesium 20 MG Pack  Commonly known as: NEXIUM     ezetimibe 10 MG tablet  Commonly known as: ZETIA     gabapentin 600 MG tablet  Commonly known as: NEURONTIN     PARoxetine 20 MG tablet  Commonly known as: PAXIL  TAKE 1 TABLET BY MOUTH EVERY DAY     potassium chloride 10 MEQ extended release capsule  Commonly known as: MICRO-K     PROLIA SC     tamoxifen 20 MG tablet  Commonly known as: NOLVADEX  Take 1 tablet by mouth in the morning. vitamin D 1000 UNIT Tabs tablet  Commonly known as: CHOLECALCIFEROL            STOP taking these medications      metoprolol tartrate 25 MG tablet  Commonly known as: LOPRESSOR     montelukast 10 MG tablet  Commonly known as: SINGULAIR     triamterene-hydroCHLOROthiazide 37.5-25 MG per capsule  Commonly known as: DYAZIDE               Where to Get Your Medications        These medications were sent to [address] [address]      Phone: [phone]   carvedilol 12.5 MG tablet        Objective Findings at Discharge:   BP (!) 126/55   Pulse 81   Temp 98.6 °F (37 °C) (Oral)   Resp 16   Ht 4' 9\" (1.448 m)   Wt 189 lb 3.2 oz (85.8 kg)   SpO2 92%   BMI 40.94 kg/m²       Physical Exam:   General: NAD  Eyes: EOMI  ENT: neck supple  Cardiovascular: Regular rate. Respiratory: Clear to auscultation  Gastrointestinal: Soft, non tender  Genitourinary: no suprapubic tenderness  Musculoskeletal: No edema  Skin: warm, dry  Neuro: Alert. Psych: Mood appropriate.          Labs and Imaging   Echocardiogram complete 2D with doppler with color    Result Date: 8/19/2022  Transthoracic Echocardiography Report (TTE)  Demographics   Patient Name       Gloria CASE    Date of Study       08/19/2022   Date of Birth      1945        Gender              Female   Age                64 year(s)        Race    Patient Number     [id number]        Room Number         ED27   Visit Number       [id number]   Corporate ID       [id number]   Accession Number   [id number]        Sonographer         Thurmon Koyanagi, RVT Synetta Pearson, RVT   Ordering Physician Teto Ramirez MD  Interpreting        Teto Ramirez MD                                       Physician  Procedure Type of Study   TTE procedure:ECHOCARDIOGRAM COMPLETE 2D W DOPPLER W COLOR. Procedure Date Date: 08/19/2022 Start: 11:18 AM Study Location: Portable Technical Quality: Adequate visualization Indications:Chest pain. Patient Status: Routine Height: 57 inches Weight: 163 pounds BSA: 1.65 m2 BMI: 35.27 kg/m2 HR: 42 bpm BP: 163/60 mmHg  Conclusions   Summary  Left ventricular systolic function is normal.  Ejection fraction is visually estimated at 50-55%. No significant valvular disease noted. No evidence of any pericardial effusion. Signature   ------------------------------------------------------------------  Electronically signed by Teto Ramirez MD (Interpreting  physician) on 08/19/2022 at 11:52 AM  ------------------------------------------------------------------   Findings   Left Ventricle  Left ventricular systolic function is normal.  Ejection fraction is visually estimated at 50-55%. No regional wall motion abnormalites. Left ventricle size is normal.  Normal diastolic function. Left Atrium  Essentially normal left atrium. Right Atrium  Essentially normal right atrium. Right Ventricle  Essentially normal right ventricle. Aortic Valve  Structurally normal aortic valve. Mitral Valve  Mild mitral regurgitation. Tricuspid Valve  No evidence of tricuspid regurgitation. Pulmonic Valve  The pulmonic valve was not well visualized. Pericardial Effusion  No evidence of any pericardial effusion. Pleural Effusion  No evidence of pleural effusion. Miscellaneous  IVC and abdominal aorta are within normal limits. M-Mode/2D Measurements & Calculations   LV Diastolic Dimension:  LV Systolic Dimension:  LA Dimension: 3.5 cmAO Root  4.54 cm                  2.77 cm                 Dimension: 2.8 cmLA Area:  LV FS:39 %               LV Volume Diastolic: 66 16.4 cm2  LV PW Diastolic: 5.71 cm ml  LV PW Systolic: 1.18 cm  LV Volume Systolic: 29  Septum Diastolic: 9.71   ml  cm                       LV EDV/LV EDV Index: 66 RV Diastolic Dimension:  Septum Systolic: 1.08 cm DA/15 D4LW ESV/LV ESV   2.74 cm  CO: 3.19 l/min           Index: 29 ml/18 m2  CI: 1.93 l/m*m2          EF Calculated (A4C):    LA/Aorta: 1.25                           56.1 %  LV Area Diastolic: 96.8  EF Calculated (2D):     LA volume/Index: 46 ml  cm2                      69.5 %                  /84J1  LV Area Systolic: 15 cm2                           LV Length: 7.46 cm                            LVOT: 1.9 cm  Doppler Measurements & Calculations   MV Peak E-Wave: 137     AV Peak Velocity: 126 cm/s   LVOT Peak Velocity: 114  cm/s                    AV Peak Gradient: 6.35 mmHg  cm/s  MV Peak A-Wave: 103     AV Mean Velocity: 85.3 cm/s  LVOT Mean Velocity:  cm/s                    AV Mean Gradient: 3 mmHg     78.6 cm/s  MV E/A Ratio: 1.33      AV VTI: 29.1 cm              LVOT Peak Gradient: 5  MV Peak Gradient: 7.51  AV Area (Continuity):2.61    mmHgLVOT Mean Gradient:  mmHg                    cm2                          3 mmHg   MV P1/2t: 48 msec       LVOT VTI: 26.8 cm  MVA by PHT:4.58 cm2   MV E' Septal Velocity:  14.9 cm/s  MV E' Lateral Velocity:  12.6 cm/s  MV E/E' septal: 9.19  MV E/E' lateral: 10.87      XR CHEST PORTABLE    Result Date: 8/22/2022  EXAMINATION: ONE XRAY VIEW OF THE CHEST 8/22/2022 12:42 pm COMPARISON: Chest radiograph 08/19/2022.  HISTORY: ORDERING SYSTEM PROVIDED HISTORY: Pacemaker placement and rule out pneumothorax TECHNOLOGIST PROVIDED HISTORY: Reason for exam:->Pacemaker placement and rule out pneumothorax Reason for Exam: Pacemaker placement and rule out pneumothorax FINDINGS: A dual lead left chest pacemaker is in place. Thoracic spinal stimulator leads are noted. The cardiomediastinal silhouette is unchanged. No pneumothorax, vascular congestion, consolidation, or pleural effusion is identified. No acute osseous abnormality. Interval left chest pacemaker placement without complication. No pneumothorax. XR CHEST PORTABLE    Result Date: 8/19/2022  EXAMINATION: ONE XRAY VIEW OF THE CHEST 8/19/2022 11:06 am COMPARISON: 10/15/2018 HISTORY: ORDERING SYSTEM PROVIDED HISTORY: dizziness TECHNOLOGIST PROVIDED HISTORY: Reason for exam:->dizziness Reason for Exam: dizziness FINDINGS: The lungs are without acute focal process. There is no effusion or pneumothorax. The cardiomediastinal silhouette is without acute process. The osseous structures are without acute process. No acute process. CBC:   Recent Labs     08/22/22  0410   WBC 8.4   HGB 9.3*        BMP:    Recent Labs     08/21/22  1350 08/22/22  0410    143   K 4.1 3.7    111*   CO2 20* 22   BUN 17 18   CREATININE 0.9 0.8   GLUCOSE 118* 100*     Hepatic: No results for input(s): AST, ALT, ALB, BILITOT, ALKPHOS in the last 72 hours. Lipids:   Lab Results   Component Value Date/Time    CHOL 183 09/20/2021 10:59 AM    HDL 52 09/20/2021 10:59 AM    TRIG 212 09/20/2021 10:59 AM     Hemoglobin A1C: No results found for: LABA1C  TSH: No results found for: TSH  Troponin: No results found for: TROPONINT  Lactic Acid: No results for input(s): LACTA in the last 72 hours. BNP: No results for input(s): PROBNP in the last 72 hours.   UA:  Lab Results   Component Value Date/Time    NITRU NEGATIVE 08/19/2022 12:01 PM    COLORU YELLOW 08/19/2022 12:01 PM    WBCUA NONE SEEN 08/19/2022 12:01 PM    RBCUA 8 08/19/2022 12:01 PM    MUCUS RARE 08/19/2022 12:01 PM    TRICHOMONAS NONE SEEN 08/19/2022 12:01 PM    YEAST RARE 08/19/2022 12:01 PM    BACTERIA NEGATIVE 08/19/2022 12:01 PM    CLARITYU CLEAR 08/19/2022 12:01 PM    SPECGRAV 1.015 08/19/2022 12:01 PM    LEUKOCYTESUR NEGATIVE 08/19/2022 12:01 PM    UROBILINOGEN 0.2 08/19/2022 12:01 PM    BILIRUBINUR NEGATIVE 08/19/2022 12:01 PM    BLOODU MODERATE 08/19/2022 12:01 PM    Yamil Squire NEGATIVE 08/19/2022 12:01 PM     Urine Cultures: No results found for: LABURIN  Blood Cultures: No results found for: BC  No results found for: BLOODCULT2  Organism: No results found for: ORG    Time Spent Discharging patient 35 minutes    Electronically signed by Kris Waters MD on 8/23/2022 at 10:09 PM

## 2022-08-29 PROBLEM — I44.30 AV BLOCK: Status: ACTIVE | Noted: 2022-08-29

## 2022-08-29 ASSESSMENT — ENCOUNTER SYMPTOMS
ABDOMINAL PAIN: 0
BACK PAIN: 0
BLOOD IN STOOL: 0
SHORTNESS OF BREATH: 0
PHOTOPHOBIA: 0
COLOR CHANGE: 0
CONSTIPATION: 0
COUGH: 0
EYE PAIN: 0
DIARRHEA: 0
NAUSEA: 0
VOMITING: 0
WHEEZING: 0
CHEST TIGHTNESS: 0

## 2022-09-01 ENCOUNTER — NURSE ONLY (OUTPATIENT)
Dept: CARDIOLOGY CLINIC | Age: 77
End: 2022-09-01

## 2022-09-01 VITALS
HEIGHT: 57 IN | SYSTOLIC BLOOD PRESSURE: 112 MMHG | WEIGHT: 162.2 LBS | BODY MASS INDEX: 34.99 KG/M2 | HEART RATE: 70 BPM | DIASTOLIC BLOOD PRESSURE: 60 MMHG

## 2022-09-01 NOTE — PATIENT INSTRUCTIONS
Please be informed that if you contact our office outside of normal business hours the physician on call cannot help with any scheduling or rescheduling issues, procedure instruction questions or any type of medication issue. We advise you for any urgent/emergency that you go to the nearest emergency room! PLEASE CALL OUR OFFICE DURING NORMAL BUSINESS HOURS    Monday - Friday   8 am to 5 pm    [place]: [phone]    [place]:  [phone]      **It is YOUR responsibilty to bring medication bottles and/or updated medication list to [address] [place] [address].  This will allow us to better serve you and all your healthcare needs**

## 2022-09-01 NOTE — PROGRESS NOTES
Patient here for 10-day site check status post implantation of dual-chamber pacemaker  Dressing removed   Site cleaned in sterile fashion  The incision site is well approximated  No swelling or hematoma noted  Steri-Strips applied to incision site  Patient at this time is able to drive and shower    Patient to follow-up in 1 month

## 2022-09-22 ENCOUNTER — OFFICE VISIT (OUTPATIENT)
Dept: CARDIOLOGY CLINIC | Age: 77
End: 2022-09-22

## 2022-09-22 VITALS
HEART RATE: 70 BPM | HEIGHT: 57 IN | SYSTOLIC BLOOD PRESSURE: 134 MMHG | DIASTOLIC BLOOD PRESSURE: 82 MMHG | BODY MASS INDEX: 35.9 KG/M2 | WEIGHT: 166.4 LBS

## 2022-09-22 DIAGNOSIS — Z95.0 PACEMAKER: Primary | ICD-10-CM

## 2022-09-22 PROCEDURE — 99024 POSTOP FOLLOW-UP VISIT: CPT | Performed by: NURSE PRACTITIONER

## 2022-09-22 NOTE — PROGRESS NOTES
Patient here today for 4-week follow-up status post implantation of dual-chamber pacemaker. Patient reports she has been feeling well. She denies chest pain, palpitations, shortness of breath, lightheadedness, dizziness, edema or syncope. Left upper chest site is healed well. No redness no swelling no hematoma. Device interrogated. Device Assessment:    The device is Medtronic pacemaker - Dual Chamber chamber      MRI Compatible : yes    Device interrogation was performed. Mode: DDD     Sensing is normal. Impedence is normal.  Threshold is normal.     There has not been interval changes. Estimated battery life is 11.3 years     The underlying rhythm is As,.  0.5 % atrial paced; 99.8 % ventricular paced. Atrial Arrhythmia : No    Non sustained VT episodes : No    Sustained VT episodes : No    Patient activity reported 3.4 hrs/day    The patient is pacemaker dependent.     All further appointments will be scheduled with [facility] cardiology as patient follows with [facility] cardiology for cardiology needs

## 2023-01-23 NOTE — PROGRESS NOTES
MARY [id number]    57yMale  Spondylosis of lumbar region without myelopathy or radiculopathy    Anxiety disorder, unspecified    Bipolar disorder, unspecified    Encounter for other preprocedural examination    Essential (primary) hypertension    Gastro-esophageal reflux disease without esophagitis    Hyperlipidemia, unspecified    Intervertebral disc disorders with radiculopathy, lumbar region    Other specified personal risk factors, not elsewhere classified    Spinal stenosis, lumbar region with neurogenic claudication    Spinal stenosis, lumbar region without neurogenic claudication    Spinal stenosis, site unspecified    Spondylosis without myelopathy or radiculopathy, lumbar region    History of Tobacco Use    Sex Assigned At Birth    FH: alcoholism (Father)    Handoff    Mild HTN    HTN (hypertension)    HLD (hyperlipidemia)    GERD (gastroesophageal reflux disease)    Bipolar disorder    Anxiety    Spinal stenosis    Lumbar spondylosis    Lumbar spondylosis    Lumbar fusion using cage    Spinal stenosis    Spinal stenosis    At risk for venous thromboembolism (VTE)    HTN (hypertension)    Bipolar disorder    At risk for abuse of opiates    Lumbar fusion using cage    H/O carpal tunnel repair    Amputation of finger tip    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 Patient reports pain is controlled with perscribed medication. Patient is participating with PT. Patient denies acute motor or sensory changes. Denies CP, SOB, dizziness, HA.     OBJECTIVE:   Vital Signs Last 24 Hrs  T(C): 36.5 (23 Jan 2023 17:27), Max: 37 (23 Jan 2023 14:15)  T(F): 97.7 (23 Jan 2023 17:27), Max: 98.6 (23 Jan 2023 14:15)  HR: 72 (23 Jan 2023 17:27) (72 - 105)  BP: 119/73 (23 Jan 2023 17:27) (92/45 - 119/73)  BP(mean): 82 (23 Jan 2023 14:00) (57 - 91)  RR: 18 (23 Jan 2023 17:27) (14 - 18)  SpO2: 97% (23 Jan 2023 17:27) (97% - 100%)    Parameters below as of 23 Jan 2023 14:15  Patient On (Oxygen Delivery Method): room air    \par   Constitutional:Alert, awake    Spine:          Dressing: clean/dry/intact          Drains:  drains 24hour output  OUT:    Accordian (mL): 155 mL           Sensation:          Lower extremity                          sp         dp         saph       byrd         tibial                                                R          +           +             +           +          +                                               L           +           +             +           +          +                     Motor exam:          Lower extremity                        HF(l2)   KE(l3)    TA(l4)   EHL(l5)  GS(s1)                                                 R        5/5        5/5        5/5       5/5         5/5                                               L         5/5        5/5       5/5       5/5          5/5                                                 B/L LE: warm well perfused; capillary refill <3 seconds. BCR. Calves soft NT           A/P :  57y Male S/P Lumbar laminectomy and fusion POD#0  -    Pain control  -    DVT ppx  -    Check AM labs    -    Monitor Drain Output     -    Resume home meds  -    Physical Therapy  -    Weight bearing status: WBAT  -    Dispo: Home vs Acute Rehab vs [facility]      Patient Name: Evon Farias  Patient : 1945  Patient MRN: [id number]     Primary Oncologist: Lugenia Curling, MD       Date of Service: 2020      Chief Complaint:   Chief Complaint   Patient presents with    Follow-up     Broken radius and alna         Active Problem list  1. 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 .Mammogram done on 2018 revealed a suspicious lesion involving the left breast. Ultrasound and biopsy was done revealing invasive ductal carcinoma at 11 o'clock position subsequently she underwent bilateral mastectomy on 2018 right breast only showed fibrocystic changes left breast 8 mm invasive ductal carcinoma histologic grade 3 ER +85% WA +15% HER-2 by FISH was negative negative for any lymphovascular invasion 7 axillary nodes were negative [id number]. Visit here on 2018 did spend time with her and her friend. We basically talked about early stage carcinoma of the breast. We talked about most likely just treating her with hormonal agent primarily letrozole. Bone density done on 2018 had revealed mild osteopenia    She was seen in the office on January 10, 2019. Was taking letrozole on a regular basis. In general had tolerated those extremely well occasional issue with hot flashes otherwise no significant muscle or joint discomfort. She was seen in the office on 2019, per Cheo Kong for symptoms of significant worsening of muscle and joint discomfort that has started to affect her day-to-day life. Decision was made to hold off letrozole for a couple of weeks.     She was seen in the office on 2019 being off of letrozole she was doing extremely well. She was able to do almost all of her day-to-day activities. Denied any other specific new systemic complaints. March 28, 2019 long discussion with her. We discussed underlying disease process and overall good prognosis. We talked about treatment choices at this stage including changing to a different AI or tamoxifen or even no additional treatment. We felt comfortable trying tamoxifen as she did not have any previous history of clots, smoking etc..    Saw me on March 3, 2020. Stated she has started to experience a few more symptoms of hot flashes couple of weeks ago possibly relating to her stopping Paxil. Emotionally also she has felt somewhat down being off of Paxil. Otherwise physically she was doing about the same fairly active at [employer], still doing certification for 1 nursing programs. Still some chronic back issues for which she had seen Dr. Leon Noble negative for any other new systemic complaints still being followed by Dr. 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 Left hand 8/27/0:1.  Diffuse osteopenia. 2.  Scattered degenerative changes most pronounced of the first carpometacarpal joint. 3. Alinda Naas impacted intra-articular comminuted fracture of the distal radius. Left wrist:1.  Diffuse osteopenia. 2.  Nondisplaced fracture ulnar styloid process. 3.  Comminuted intra-articular mildly impacted fracture distal radius. 4.  Moderate to severe degenerative changes first carpometacarpal joint. Left forearm:Comminuted mildly impacted distal radius fracture. No other fractures of the radius and ulna. Diffuse osteopenia. Interval History  9/28/2020: Arrived alone to the clinic today. Not flashes well controlled with paxil.  She reported that she put her MARY [id number]    57yMale  Spondylosis of lumbar region without myelopathy or radiculopathy    Anxiety disorder, unspecified    Bipolar disorder, unspecified    Encounter for other preprocedural examination    Essential (primary) hypertension    Gastro-esophageal reflux disease without esophagitis    Hyperlipidemia, unspecified    Intervertebral disc disorders with radiculopathy, lumbar region    Other specified personal risk factors, not elsewhere classified    Spinal stenosis, lumbar region with neurogenic claudication    Spinal stenosis, lumbar region without neurogenic claudication    Spinal stenosis, site unspecified    Spondylosis without myelopathy or radiculopathy, lumbar region    History of Tobacco Use    Sex Assigned At Birth    FH: alcoholism (Father)    Handoff    Mild HTN    HTN (hypertension)    HLD (hyperlipidemia)    GERD (gastroesophageal reflux disease)    Bipolar disorder    Anxiety    Spinal stenosis    Lumbar spondylosis    Lumbar spondylosis    Lumbar fusion using cage    Spinal stenosis    Spinal stenosis    At risk for venous thromboembolism (VTE)    HTN (hypertension)    Bipolar disorder    At risk for abuse of opiates    Lumbar fusion using cage    H/O carpal tunnel repair    Amputation of finger tip    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 Patient reports pain is controlled with perscribed medication. Patient is participating with PT. Patient denies acute motor or sensory changes. Denies CP, SOB, dizziness, HA.     OBJECTIVE:   Vital Signs Last 24 Hrs  T(C): 36.5 (23 Jan 2023 17:27), Max: 37 (23 Jan 2023 14:15)  T(F): 97.7 (23 Jan 2023 17:27), Max: 98.6 (23 Jan 2023 14:15)  HR: 72 (23 Jan 2023 17:27) (72 - 105)  BP: 119/73 (23 Jan 2023 17:27) (92/45 - 119/73)  BP(mean): 82 (23 Jan 2023 14:00) (57 - 91)  RR: 18 (23 Jan 2023 17:27) (14 - 18)  SpO2: 97% (23 Jan 2023 17:27) (97% - 100%)    Parameters below as of 23 Jan 2023 14:15  Patient On (Oxygen Delivery Method): room air    \par   Constitutional:Alert, awake    Spine:          Dressing: clean/dry/intact          Drains: HV drain in place, functioning properly. drains 24hour output  OUT:    Accordion (mL): 155 mL           Sensation:          Lower extremity                          sp         dp         saph       byrd         tibial                                                R          +           +             +           +          +                                               L           +           +             +           +          +                     Motor exam:          Lower extremity                        HF(l2)   KE(l3)    TA(l4)   EHL(l5)  GS(s1)                                                 R        5/5        5/5        5/5       5/5         5/5                                               L         5/5        5/5       5/5       5/5          5/5                                                 B/L LE: warm well perfused; capillary refill <3 seconds. BCR. Calves soft NT           A/P :  57y Male S/P Lumbar laminectomy and fusion POD#0  -    Pain control  -    DVT ppx  -    Check AM labs    -    Monitor Drain Output     -    Resume home meds  -    Physical Therapy  -    Weight bearing status: WBAT  -    Dispo: Home vs Acute Rehab vs [facility]      flipflops and was walking to get her mail, toes got caught on the carpet and fell and fractured her left wrist. Is on a brace and is being followed by orthopedics. last year dexa scan with osteopenia. Is on vitamin D. Denied any chest pain, increased shortness of breath, palpitations or dizziness. Denied any dysphagia odynophagia. Denied any headache, vision changes or any focal weakness. Denied any abdominal pain, nausea, vomiting, diarrhea, constipation. Denied any lower extremity edema. Review of Systems   Per interval history; otherwise 10 point ROS is negative              Vital Signs:  /81 (Site: Right Upper Arm, Position: Sitting, Cuff Size: Large Adult)   Pulse 73   Temp 97.4 °F (36.3 °C) (Temporal)   Ht 4' 10\" (1.473 m)   Wt 164 lb 3.2 oz (74.5 kg)   SpO2 97%   BMI 34.32 kg/m²     Physical Exam:    CONSTITUTIONAL: awake, alert, short statured  EYES: KRISTEN, no palor or any icetrus  ENT: ATNC  NECK: No JVD  HEMATOLOGIC/LYMPHATIC: NO LAD  LUNGS: CTAB  CARDIOVASCULAR: s1s2 rrr ? ESM  ABDOMEN: soft ntnd bs pos  NEUROLOGIC:GI  SKIN: No rash  EXTREMITIES: no LE edema bilaterally  Bilateral mastectomy sites with no overlying skin changes.      Labs:    Hematology:  Lab Results   Component Value Date    WBC 6.5 03/03/2020    RBC 4.36 03/03/2020    HGB 13.2 03/03/2020    HCT 38.7 03/03/2020    MCV 88.8 03/03/2020    MCH 30.3 03/03/2020    MCHC 34.1 03/03/2020    RDW 13.5 03/03/2020     03/03/2020    MPV 9.2 03/03/2020    SEGSPCT 56.5 03/03/2020    EOSRELPCT 2.6 03/03/2020    BASOPCT 0.8 03/03/2020    LYMPHOPCT 31.3 03/03/2020    MONOPCT 8.8 (H) 03/03/2020    SEGSABS 3.7 03/03/2020    EOSABS 0.2 03/03/2020    BASOSABS 0.1 03/03/2020    LYMPHSABS 2.0 03/03/2020    MONOSABS 0.6 03/03/2020    DIFFTYPE AUTOMATED DIFFERENTIAL 03/03/2020     No results found for: ESR    Chemistry:  Lab Results   Component Value Date     03/03/2020    K 3.8 03/03/2020     03/03/2020    CO2 24 03/03/2020 BUN 25 (H) 03/03/2020    CREATININE 1.0 03/03/2020    GLUCOSE 125 (H) 03/03/2020    CALCIUM 9.3 03/03/2020    PROT 6.9 03/03/2020    LABALBU 4.2 03/03/2020    BILITOT 0.2 03/03/2020    ALKPHOS 74 03/03/2020    AST 21 03/03/2020    ALT 11 03/03/2020    LABGLOM 54 (L) 03/03/2020    GFRAA >60 03/03/2020     No results found for: MMA, LDH, HOMOCYSTEINE  No components found for: LD  Lab Results   Component Value Date    TSHHS 0.022 (L) 08/06/2011    T4FREE 1.23 08/06/2011    FT3 3.0 08/06/2011       Immunology:  Lab Results   Component Value Date    PROT 6.9 03/03/2020     No results found for: Heddy Livers, KLFLCR  No results found for: B2M    Coagulation Panel:  Lab Results   Component Value Date    PROTIME 10.4 08/10/2018    INR 0.91 08/10/2018    APTT 30.0 08/10/2018       Anemia Panel:  No results found for: Phylliss Ilsa    Tumor Markers:  No results found for: , CEA, , LABCA2, PSA      Imaging: Reviewed     Pathology:Reviewed     Observations:  Performance Status: ECOG 0  Depression Status: PHQ-9 Total Score: 0 (9/28/2020  3:31 PM)          Assessment & Plan:  Stage I IBC left: ER/UT positive Her 2 negative. S/p bilateral mastectomies on October 18 2018. Was on letrozole but had significant AE with joint and muscle pains, hence changed to tamoxifen. Tolerating fairly well as long as she is on Paxil for hot flashes. BDS with osteopenia. Is on Vitamin D but not calcium. Repeat Bone density scan due in 2021. Tamoxifen refilled, plan to continue for total 5 years. Left comminuted radial/ulnar fracture: Brace and is being followed by ortho    Hyponatremia mild and hypokalemia: Is on K replacement. Continue other medical care    Discussed healthy lifestyle including healthy diet, regular exercise as tolerate and weight loss if possible. Also discussed the importance of age appropriate screening tools.      Discussed the above findings and plan with her and answered all questions    Recommend follow up with PCP and other specialists. Please do not hesitate to contact us if you need any further information.     RTC March 2021 or earlier if new Sx    ALEX     Electronically signed by Arvind Cardenas MD on 9/28/2020 at 5:54 PM

## 2023-02-20 ENCOUNTER — HOSPITAL ENCOUNTER (OUTPATIENT)
Dept: INFUSION THERAPY | Age: 78
Discharge: HOME | End: 2023-02-20
Payer: COMMERCIAL

## 2023-02-20 DIAGNOSIS — M85.89 OSTEOPENIA OF MULTIPLE SITES: ICD-10-CM

## 2023-02-20 DIAGNOSIS — Z78.0 MENOPAUSE: ICD-10-CM

## 2023-02-20 DIAGNOSIS — C50.412 MALIGNANT NEOPLASM OF UPPER-OUTER QUADRANT OF LEFT FEMALE BREAST, UNSPECIFIED ESTROGEN RECEPTOR STATUS (HCC): ICD-10-CM

## 2023-02-20 LAB
ALBUMIN SERPL-MCNC: 4.4 GM/DL (ref 3.4–5)
ALP BLD-CCNC: 41 IU/L (ref 40–128)
ALT SERPL-CCNC: 13 U/L (ref 10–40)
ANION GAP SERPL CALCULATED.3IONS-SCNC: 13 MMOL/L (ref 4–16)
AST SERPL-CCNC: 21 IU/L (ref 15–37)
BASOPHILS ABSOLUTE: 0 K/CU MM
BASOPHILS RELATIVE PERCENT: 0.5 % (ref 0–1)
BILIRUB SERPL-MCNC: 0.2 MG/DL (ref 0–1)
BUN SERPL-MCNC: 19 MG/DL (ref 6–23)
CALCIUM SERPL-MCNC: 9.1 MG/DL (ref 8.3–10.6)
CHLORIDE BLD-SCNC: 104 MMOL/L (ref 99–110)
CO2: 24 MMOL/L (ref 21–32)
CREAT SERPL-MCNC: 1 MG/DL (ref 0.6–1.1)
DIFFERENTIAL TYPE: ABNORMAL
EOSINOPHILS ABSOLUTE: 0.1 K/CU MM
EOSINOPHILS RELATIVE PERCENT: 2.2 % (ref 0–3)
GFR SERPL CREATININE-BSD FRML MDRD: 57 ML/MIN/1.73M2
GLUCOSE SERPL-MCNC: 115 MG/DL (ref 70–99)
HCT VFR BLD CALC: 37.8 % (ref 37–47)
HEMOGLOBIN: 12.2 GM/DL (ref 12.5–16)
LYMPHOCYTES ABSOLUTE: 1.7 K/CU MM
LYMPHOCYTES RELATIVE PERCENT: 28.8 % (ref 24–44)
MCH RBC QN AUTO: 29.8 PG (ref 27–31)
MCHC RBC AUTO-ENTMCNC: 32.3 % (ref 32–36)
MCV RBC AUTO: 92.2 FL (ref 78–100)
MONOCYTES ABSOLUTE: 0.5 K/CU MM
MONOCYTES RELATIVE PERCENT: 8.4 % (ref 0–4)
PDW BLD-RTO: 14.5 % (ref 11.7–14.9)
PLATELET # BLD: 252 K/CU MM (ref 140–440)
PMV BLD AUTO: 9.3 FL (ref 7.5–11.1)
POTASSIUM SERPL-SCNC: 4.5 MMOL/L (ref 3.5–5.1)
RBC # BLD: 4.1 M/CU MM (ref 4.2–5.4)
SEGMENTED NEUTROPHILS ABSOLUTE COUNT: 3.5 K/CU MM
SEGMENTED NEUTROPHILS RELATIVE PERCENT: 60.1 % (ref 36–66)
SODIUM BLD-SCNC: 141 MMOL/L (ref 135–145)
TOTAL PROTEIN: 6.4 GM/DL (ref 6.4–8.2)
WBC # BLD: 5.8 K/CU MM (ref 4–10.5)

## 2023-02-20 PROCEDURE — 86300 IMMUNOASSAY TUMOR CA 15-3: CPT

## 2023-02-20 PROCEDURE — 85025 COMPLETE CBC W/AUTO DIFF WBC: CPT

## 2023-02-20 PROCEDURE — 36415 COLL VENOUS BLD VENIPUNCTURE: CPT

## 2023-02-20 PROCEDURE — 80053 COMPREHEN METABOLIC PANEL: CPT

## 2023-02-21 LAB — CANCER AG27-29 SERPL-ACNC: 44.8 U/ML

## 2023-02-27 ENCOUNTER — HOSPITAL ENCOUNTER (OUTPATIENT)
Dept: INFUSION THERAPY | Age: 78
Discharge: HOME | End: 2023-02-27
Payer: COMMERCIAL

## 2023-02-27 ENCOUNTER — OFFICE VISIT (OUTPATIENT)
Dept: ONCOLOGY | Age: 78
End: 2023-02-27
Payer: COMMERCIAL

## 2023-02-27 VITALS
WEIGHT: 168.4 LBS | SYSTOLIC BLOOD PRESSURE: 142 MMHG | OXYGEN SATURATION: 96 % | HEART RATE: 84 BPM | TEMPERATURE: 98 F | HEIGHT: 57 IN | DIASTOLIC BLOOD PRESSURE: 65 MMHG | BODY MASS INDEX: 36.33 KG/M2

## 2023-02-27 DIAGNOSIS — M85.89 OSTEOPENIA OF MULTIPLE SITES: ICD-10-CM

## 2023-02-27 DIAGNOSIS — C50.412 MALIGNANT NEOPLASM OF UPPER-OUTER QUADRANT OF LEFT FEMALE BREAST, UNSPECIFIED ESTROGEN RECEPTOR STATUS (HCC): Primary | ICD-10-CM

## 2023-02-27 DIAGNOSIS — Z78.0 MENOPAUSE: ICD-10-CM

## 2023-02-27 DIAGNOSIS — Z78.0 MENOPAUSE: Primary | ICD-10-CM

## 2023-02-27 PROCEDURE — 99211 OFF/OP EST MAY X REQ PHY/QHP: CPT

## 2023-02-27 PROCEDURE — 1124F ACP DISCUSS-NO DSCNMKR DOCD: CPT | Performed by: INTERNAL MEDICINE

## 2023-02-27 PROCEDURE — 99213 OFFICE O/P EST LOW 20 MIN: CPT | Performed by: INTERNAL MEDICINE

## 2023-02-27 RX ORDER — TAMOXIFEN CITRATE 20 MG/1
20 TABLET ORAL DAILY
Qty: 90 TABLET | Refills: 1 | Status: SHIPPED | OUTPATIENT
Start: 2023-02-27

## 2023-02-27 NOTE — PROGRESS NOTES
Patient Name: Russell De Jesus  Patient : 1945  Patient MRN: [id number]     Primary Oncologist: Lucas Henriquez MD       Date of Service: 2023      Chief Complaint:   Chief Complaint   Patient presents with    Results    Follow-up        Active Problem list  1. Malignant neoplasm of upper-outer quadrant of left female breast, unspecified estrogen receptor status ([address]    2. Osteopenia of multiple sites    3. 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 .Mammogram done on 2018 revealed a suspicious lesion involving the left breast. Ultrasound and biopsy was done revealing invasive ductal carcinoma at 11 o'clock position subsequently she underwent bilateral mastectomy on 2018 right breast only showed fibrocystic changes left breast 8 mm invasive ductal carcinoma histologic grade 3 ER +85% NJ +15% HER-2 by FISH was negative negative for any lymphovascular invasion 7 axillary nodes were negative [id number]. Visit here on 2018 did spend time with her and her friend. We basically talked about early stage carcinoma of the breast. We talked about most likely just treating her with hormonal agent primarily letrozole. Bone density done on 2018 had revealed mild osteopenia    She was seen in the office on January 10, 2019. Was taking letrozole on a regular basis. In general had tolerated those extremely well occasional issue with hot flashes otherwise no significant muscle or joint discomfort. She was seen in the office on 2019, per Jose Angel Garcia for symptoms of significant worsening of muscle and joint discomfort that has started to affect her day-to-day life. Decision was made to hold off letrozole for a couple of weeks.     She was seen in the office on 2019 being off of letrozole she was doing extremely well. She was able to do almost all of her day-to-day activities. Denied any other specific new systemic complaints. March 28, 2019 long discussion with her. We discussed underlying disease process and overall good prognosis. We talked about treatment choices at this stage including changing to a different AI or tamoxifen or even no additional treatment. We felt comfortable trying tamoxifen as she did not have any previous history of clots, smoking etc..    Saw me on March 3, 2020. Stated she has started to experience a few more symptoms of hot flashes couple of weeks ago possibly relating to her stopping Paxil. Emotionally also she has felt somewhat down being off of Paxil. Otherwise physically she was doing about the same fairly active at [employer], still doing certification for 1 nursing programs. Still some chronic back issues for which she had seen Dr. Carcamo Blend negative for any other new systemic complaints still being followed by Dr. 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 Left hand 8/27/0:1. Diffuse osteopenia. 2.  Scattered degenerative changes most pronounced of the first carpometacarpal joint. 3.  Mildly impacted intra-articular comminuted fracture of the distal radius. Left wrist:1. Diffuse osteopenia. 2.  Nondisplaced fracture ulnar styloid process. 3.  Comminuted intra-articular mildly impacted fracture distal radius. 4.  Moderate to severe degenerative changes first carpometacarpal joint. Left forearm:Comminuted mildly impacted distal radius fracture. No other fractures of the radius and ulna. Diffuse osteopenia. 9/2021: Dexa scan with osteopenia.      Dec 2021 mechanical fall    Rodol[place] 10 2022 with degenerative disease, spinal stenosis and T11 and L4 compression fracture. 2/20/2023 CA 27-29 was 44.8 CMP With glucose of 115 otherwise within normal limits  CBC with WBC of 5.8 hemoglobin of 12.2 hematocrit 37.8 MCV of 92.2 and platelets of 742    Interval History  2/27/2023: Arrived alone to the clinic today. Overall feels fine other than chronic stable back pain. She denies any new breast mass or any axillary lymphadenopathy. Denies any chest pain, increase shortness of breath, palpitations. She had a pacer placed to last year secondary to bradycardia, AV block. No abdominal pain, nausea, vomiting. No  symptoms. No bleeding. Review of Systems   Per interval history; otherwise 10 point ROS is negative              Vital Signs:  BP (!) 142/65 (Site: Right Lower Arm, Position: Sitting, Cuff Size: Medium Adult)   Pulse 84   Temp 98 °F (36.7 °C) (Temporal)   Ht 4' 9\" (1.448 m)   Wt 168 lb 6.4 oz (76.4 kg)   SpO2 96%   BMI 36.44 kg/m²     Physical Exam:    CONSTITUTIONAL: awake, alert, short statured  EYES: KRISTEN, no palor or any icetrus  ENT: ATNC  NECK: No JVD  HEMATOLOGIC/LYMPHATIC: NO LAD  LUNGS: CTAB  CARDIOVASCULAR: s1s2 bradycardia  ABDOMEN: soft ntnd bs pos  NEUROLOGIC:GI  SKIN: No rash  EXTREMITIES: no LE edema bilaterally  Bilateral mastectomy sites with no overlying skin changes.  No axillary lad bilaterally    Labs:    Hematology:  Lab Results   Component Value Date    WBC 5.8 02/20/2023    RBC 4.10 (L) 02/20/2023    HGB 12.2 (L) 02/20/2023    HCT 37.8 02/20/2023    MCV 92.2 02/20/2023    MCH 29.8 02/20/2023    MCHC 32.3 02/20/2023    RDW 14.5 02/20/2023     02/20/2023    MPV 9.3 02/20/2023    SEGSPCT 60.1 02/20/2023    EOSRELPCT 2.2 02/20/2023    BASOPCT 0.5 02/20/2023    LYMPHOPCT 28.8 02/20/2023    MONOPCT 8.4 (H) 02/20/2023    SEGSABS 3.5 02/20/2023    EOSABS 0.1 02/20/2023    BASOSABS 0.0 02/20/2023    LYMPHSABS 1.7 02/20/2023    MONOSABS 0.5 02/20/2023    DIFFTYPE AUTOMATED DIFFERENTIAL 02/20/2023 No results found for: ESR    Chemistry:  Lab Results   Component Value Date     02/20/2023    K 4.5 02/20/2023     02/20/2023    CO2 24 02/20/2023    BUN 19 02/20/2023    CREATININE 1.0 02/20/2023    GLUCOSE 115 (H) 02/20/2023    CALCIUM 9.1 02/20/2023    PROT 6.4 02/20/2023    LABALBU 4.4 02/20/2023    BILITOT 0.2 02/20/2023    ALKPHOS 41 02/20/2023    AST 21 02/20/2023    ALT 13 02/20/2023    LABGLOM 57 (L) 02/20/2023    GFRAA >60 08/22/2022    PHOS 2.7 08/22/2022    MG 1.9 08/22/2022     No results found for: MMA, LDH, HOMOCYSTEINE  No components found for: LD  Lab Results   Component Value Date    TSHHS 2.270 08/19/2022    T4FREE 0.97 09/20/2021    FT3 3.0 08/06/2011       Immunology:  Lab Results   Component Value Date    PROT 6.4 02/20/2023     No results found for: Carmen Madhavi, KLFLCR  No results found for: B2M    Coagulation Panel:  Lab Results   Component Value Date    PROTIME 12.0 08/22/2022    INR 0.93 08/22/2022    APTT 27.9 08/22/2022       Anemia Panel:  No results found for: Carrie Rizvi    Tumor Markers:  Lab Results   Component Value Date    LABCA2 44.8 (H) 02/20/2023         Imaging: Reviewed     Pathology:Reviewed     Observations:  Performance Status: ECOG 0  Depression Status: PHQ-9 Total Score: 0 (2/27/2023 11:18 AM)          Assessment & Plan:  Stage I IBC left: ER/MS positive Her 2 negative. S/p bilateral mastectomies on October 18 2018. Was on letrozole but had significant AE with joint and muscle pains, hence changed to tamoxifen looks like since march 2019. Tolerating fairly well as long as she is on Paxil for hot flashes. Bone density scan in September 2021 with osteopenia. Is on calcium, vitamin D and Prolia. Tamoxifen  planned to continue for total 5 years, until 2024. CA 27-29 was 44 in February 2023  Will continue to follow per NCCN guidelines. Osteopenia/psteoporosis/compression fractures: As per DEXA scan in 2021. S/p vertebroplasty.  Is on Vitamin D. Is on Prolia. Repeat DEXA scan this year in 2023. Bradycardia: Status post pacer and is followed by cardiology    Discussed healthy lifestyle including healthy diet, regular exercise as tolerate and weight loss if possible. Also discussed the importance of age appropriate screening tools. Colonoscopy due in 2025. No further Pap smears. Discussed the above findings and plan with her and answered all questions    Recommend follow up with PCP and other specialists. Please do not hesitate to contact us if you need any further information.     RTC AUGUST 2023 or earlier if new Sx    ALEX

## 2023-02-27 NOTE — PROGRESS NOTES
MA Rooming Questions  Patient: Jose Elias Landeros  MRN: [id number]    Date: 2/27/2023        1. Do you have any new issues? yes - pacemaker placed in AUGUST 2022         2. Do you need any refills on medications? yes - TAMOXIFEN     3. Have you had any imaging done since your last visit? yes - LABS 2/20    4. Have you been hospitalized or seen in the emergency room since your last visit here?   yes - 8/19-8/23 PACEMAKER PLACEMENT     5. Did the patient have a depression screening completed today?  Yes    PHQ-9 Total Score: 0 (2/27/2023 11:18 AM)       PHQ-9 Given to (if applicable):               PHQ-9 Score (if applicable):                     [] Positive     []  Negative              Does question #9 need addressed (if applicable)                     [] Yes    []  No               Najma Veliz CMA

## 2023-08-08 ENCOUNTER — CLINICAL DOCUMENTATION (OUTPATIENT)
Dept: ONCOLOGY | Age: 78
End: 2023-08-08

## 2023-08-08 NOTE — PROGRESS NOTES
8/8/23 - faxed Dexa Scan order and notes to [facility] for scheduling - successful fax

## 2023-08-14 ENCOUNTER — HOSPITAL ENCOUNTER (OUTPATIENT)
Dept: INFUSION THERAPY | Age: 78
Discharge: HOME | End: 2023-08-14
Payer: COMMERCIAL

## 2023-08-14 DIAGNOSIS — Z78.0 MENOPAUSE: ICD-10-CM

## 2023-08-14 DIAGNOSIS — C50.412 MALIGNANT NEOPLASM OF UPPER-OUTER QUADRANT OF LEFT FEMALE BREAST, UNSPECIFIED ESTROGEN RECEPTOR STATUS (HCC): ICD-10-CM

## 2023-08-14 DIAGNOSIS — M85.89 OSTEOPENIA OF MULTIPLE SITES: ICD-10-CM

## 2023-08-14 LAB
ALBUMIN SERPL-MCNC: 4.5 GM/DL (ref 3.4–5)
ALP BLD-CCNC: 42 IU/L (ref 40–128)
ALT SERPL-CCNC: 15 U/L (ref 10–40)
ANION GAP SERPL CALCULATED.3IONS-SCNC: 15 MMOL/L (ref 4–16)
AST SERPL-CCNC: 23 IU/L (ref 15–37)
BASOPHILS ABSOLUTE: 0 K/CU MM
BASOPHILS RELATIVE PERCENT: 0.6 % (ref 0–1)
BILIRUB SERPL-MCNC: 0.4 MG/DL (ref 0–1)
BUN SERPL-MCNC: 20 MG/DL (ref 6–23)
CALCIUM SERPL-MCNC: 9.4 MG/DL (ref 8.3–10.6)
CHLORIDE BLD-SCNC: 98 MMOL/L (ref 99–110)
CO2: 24 MMOL/L (ref 21–32)
CREAT SERPL-MCNC: 1 MG/DL (ref 0.6–1.1)
DIFFERENTIAL TYPE: ABNORMAL
EOSINOPHILS ABSOLUTE: 0.1 K/CU MM
EOSINOPHILS RELATIVE PERCENT: 2.2 % (ref 0–3)
GFR SERPL CREATININE-BSD FRML MDRD: 57 ML/MIN/1.73M2
GLUCOSE SERPL-MCNC: 107 MG/DL (ref 70–99)
HCT VFR BLD CALC: 39.1 % (ref 37–47)
HEMOGLOBIN: 12.8 GM/DL (ref 12.5–16)
LYMPHOCYTES ABSOLUTE: 2 K/CU MM
LYMPHOCYTES RELATIVE PERCENT: 32.1 % (ref 24–44)
MCH RBC QN AUTO: 29.6 PG (ref 27–31)
MCHC RBC AUTO-ENTMCNC: 32.7 % (ref 32–36)
MCV RBC AUTO: 90.5 FL (ref 78–100)
MONOCYTES ABSOLUTE: 0.8 K/CU MM
MONOCYTES RELATIVE PERCENT: 12.8 % (ref 0–4)
PDW BLD-RTO: 13.4 % (ref 11.7–14.9)
PLATELET # BLD: 253 K/CU MM (ref 140–440)
PMV BLD AUTO: 9.3 FL (ref 7.5–11.1)
POTASSIUM SERPL-SCNC: 3.9 MMOL/L (ref 3.5–5.1)
RBC # BLD: 4.32 M/CU MM (ref 4.2–5.4)
SEGMENTED NEUTROPHILS ABSOLUTE COUNT: 3.3 K/CU MM
SEGMENTED NEUTROPHILS RELATIVE PERCENT: 52.3 % (ref 36–66)
SODIUM BLD-SCNC: 137 MMOL/L (ref 135–145)
TOTAL PROTEIN: 6.9 GM/DL (ref 6.4–8.2)
WBC # BLD: 6.2 K/CU MM (ref 4–10.5)

## 2023-08-14 PROCEDURE — 36415 COLL VENOUS BLD VENIPUNCTURE: CPT

## 2023-08-14 PROCEDURE — 85025 COMPLETE CBC W/AUTO DIFF WBC: CPT

## 2023-08-14 PROCEDURE — 80053 COMPREHEN METABOLIC PANEL: CPT

## 2023-08-14 PROCEDURE — 86300 IMMUNOASSAY TUMOR CA 15-3: CPT

## 2023-08-15 LAB — CANCER AG27-29 SERPL-ACNC: 63.8 U/ML

## 2023-08-21 ENCOUNTER — OFFICE VISIT (OUTPATIENT)
Dept: ONCOLOGY | Age: 78
End: 2023-08-21
Payer: COMMERCIAL

## 2023-08-21 ENCOUNTER — HOSPITAL ENCOUNTER (OUTPATIENT)
Dept: INFUSION THERAPY | Age: 78
Discharge: HOME | End: 2023-08-21
Payer: COMMERCIAL

## 2023-08-21 VITALS
HEIGHT: 57 IN | RESPIRATION RATE: 16 BRPM | OXYGEN SATURATION: 95 % | TEMPERATURE: 97.8 F | BODY MASS INDEX: 34.61 KG/M2 | SYSTOLIC BLOOD PRESSURE: 145 MMHG | WEIGHT: 160.4 LBS | DIASTOLIC BLOOD PRESSURE: 92 MMHG | HEART RATE: 74 BPM

## 2023-08-21 DIAGNOSIS — C50.412 MALIGNANT NEOPLASM OF UPPER-OUTER QUADRANT OF LEFT FEMALE BREAST, UNSPECIFIED ESTROGEN RECEPTOR STATUS (HCC): Primary | ICD-10-CM

## 2023-08-21 PROCEDURE — 1124F ACP DISCUSS-NO DSCNMKR DOCD: CPT | Performed by: INTERNAL MEDICINE

## 2023-08-21 PROCEDURE — 99211 OFF/OP EST MAY X REQ PHY/QHP: CPT

## 2023-08-21 PROCEDURE — 99214 OFFICE O/P EST MOD 30 MIN: CPT | Performed by: INTERNAL MEDICINE

## 2023-08-21 RX ORDER — TAMOXIFEN CITRATE 20 MG/1
20 TABLET ORAL DAILY
Qty: 90 TABLET | Refills: 3 | Status: SHIPPED | OUTPATIENT
Start: 2023-08-21

## 2023-08-21 ASSESSMENT — PATIENT HEALTH QUESTIONNAIRE - PHQ9
SUM OF ALL RESPONSES TO PHQ QUESTIONS 1-9: 0
1. LITTLE INTEREST OR PLEASURE IN DOING THINGS: 0
SUM OF ALL RESPONSES TO PHQ QUESTIONS 1-9: 0
SUM OF ALL RESPONSES TO PHQ9 QUESTIONS 1 & 2: 0
SUM OF ALL RESPONSES TO PHQ QUESTIONS 1-9: 0
SUM OF ALL RESPONSES TO PHQ QUESTIONS 1-9: 0
2. FEELING DOWN, DEPRESSED OR HOPELESS: 0

## 2023-08-21 NOTE — PROGRESS NOTES
MA Rooming Questions  Patient: Pramod Corona  MRN: [id number]    Date: 8/21/2023        1. Do you have any new issues?   no         2. Do you need any refills on medications? yes - if pt will be continuing Tamoxifen she'll need a refill    3. Have you had any imaging done since your last visit? yes - MRI OF NECK & LOW BACK @ [facility] 7/13    4. Have you been hospitalized or seen in the emergency room since your last visit here?   no    5. Did the patient have a depression screening completed today?  Yes    No data recorded     PHQ-9 Given to (if applicable):               PHQ-9 Score (if applicable):                     [] Positive     []  Negative              Does question #9 need addressed (if applicable)                     [] Yes    []  No               Nickie Nava MA
tamoxifen looks like since march 2019. Tolerating fairly well as long as she is on Paxil for hot flashes. Bone density scan in September 2021 with osteopenia. Is on calcium, vitamin D and Prolia. Tamoxifen  planned to continue for total 5 years, until end of this year 0  CA 27-29 was 62.11 in August 2023. Recommend further evaluation including PET scan. Will continue to follow per NCCN guidelines. Diarrhea, recommend that she follows with GI for colonoscopy. Antidiarrheal agents if no evidence of C. difficile. Adequate fluid intake. Hypertension: She is on carvedilol and reported that she is compliant with it. Monitor BP 2 times a day at home and log. Recommend low salt diet and exercise as tolerated. Discuss with PCP or Cardiologist if remains high. Osteopenia/osteoporosis/compression fractures: As per DEXA scan in 2021. S/p vertebroplasty. Is on Vitamin D. Is on Prolia. Repeat DEXA scan this year in 2023. Discussed healthy lifestyle including healthy diet, regular exercise as tolerate and weight loss if possible. Also discussed the importance of age appropriate screening tools. Colonoscopy due in 2025. No further Pap smears. Discussed the above findings and plan with her and answered all questions    Recommend follow up with PCP and other specialists. Please do not hesitate to contact us if you need any further information. RTC feb 2024 or earlier if new Sx    Time Spent with patient for face to face, exam, education, discussing treatment options, reviewing imaging, reviewing labs, decision making, Pre-charting, and documenting today's visit, >30 mins.      Leticia

## 2023-08-24 ENCOUNTER — HOSPITAL ENCOUNTER (OUTPATIENT)
Dept: PET IMAGING | Age: 78
Discharge: HOME | End: 2023-08-24
Attending: INTERNAL MEDICINE
Payer: COMMERCIAL

## 2023-08-24 DIAGNOSIS — C50.412 MALIGNANT NEOPLASM OF UPPER-OUTER QUADRANT OF LEFT FEMALE BREAST, UNSPECIFIED ESTROGEN RECEPTOR STATUS (HCC): ICD-10-CM

## 2023-08-24 PROCEDURE — 3430000000 HC RX DIAGNOSTIC RADIOPHARMACEUTICAL: Performed by: INTERNAL MEDICINE

## 2023-08-24 PROCEDURE — A9552 F18 FDG: HCPCS | Performed by: INTERNAL MEDICINE

## 2023-08-24 PROCEDURE — 2580000003 HC RX 258: Performed by: INTERNAL MEDICINE

## 2023-08-24 PROCEDURE — 78815 PET IMAGE W/CT SKULL-THIGH: CPT

## 2023-08-24 RX ORDER — FLUDEOXYGLUCOSE F 18 200 MCI/ML
16.36 INJECTION, SOLUTION INTRAVENOUS
Status: COMPLETED | OUTPATIENT
Start: 2023-08-24 | End: 2023-08-24

## 2023-08-24 RX ORDER — SODIUM CHLORIDE 0.9 % (FLUSH) 0.9 %
10 SYRINGE (ML) INJECTION PRN
Status: COMPLETED | OUTPATIENT
Start: 2023-08-24 | End: 2023-08-24

## 2023-08-24 RX ADMIN — SODIUM CHLORIDE, PRESERVATIVE FREE 10 ML: 5 INJECTION INTRAVENOUS at 12:53

## 2023-08-24 RX ADMIN — FLUDEOXYGLUCOSE F 18 16.36 MILLICURIE: 200 INJECTION, SOLUTION INTRAVENOUS at 12:53

## 2023-08-29 ENCOUNTER — CLINICAL DOCUMENTATION (OUTPATIENT)
Dept: ONCOLOGY | Age: 78
End: 2023-08-29

## 2023-08-29 NOTE — PROGRESS NOTES
Called patient @ [phone] to review PET scan results: Moderate activity is seen at the distal rectum, a site where activity can be physiologically variable. Patient will need to f/u with GI. Patient states she is not currently established with GI and would prefer to talk to PCP for GI referral. No further needs addressed at this time.

## 2023-10-04 ENCOUNTER — ANESTHESIA EVENT (OUTPATIENT)
Dept: ENDOSCOPY | Age: 78
End: 2023-10-04
Payer: COMMERCIAL

## 2023-10-04 NOTE — PROGRESS NOTES
Spoke with patient and she will arrive at 0830 at [facility] on 10/5/2023 for her procedure at 1000. IV order is in epic.

## 2023-10-05 ENCOUNTER — HOSPITAL ENCOUNTER (OUTPATIENT)
Age: 78
Setting detail: OUTPATIENT SURGERY
Discharge: HOME | End: 2023-10-05
Attending: INTERNAL MEDICINE | Admitting: INTERNAL MEDICINE
Payer: COMMERCIAL

## 2023-10-05 ENCOUNTER — ANESTHESIA (OUTPATIENT)
Dept: ENDOSCOPY | Age: 78
End: 2023-10-05
Payer: COMMERCIAL

## 2023-10-05 VITALS
WEIGHT: 160 LBS | HEIGHT: 57 IN | RESPIRATION RATE: 16 BRPM | BODY MASS INDEX: 34.52 KG/M2 | HEART RATE: 61 BPM | TEMPERATURE: 98 F | SYSTOLIC BLOOD PRESSURE: 151 MMHG | OXYGEN SATURATION: 98 % | DIASTOLIC BLOOD PRESSURE: 68 MMHG

## 2023-10-05 DIAGNOSIS — R19.7 DIARRHEA, UNSPECIFIED TYPE: ICD-10-CM

## 2023-10-05 DIAGNOSIS — R94.8 ABNORMAL PET SCAN OF COLON: ICD-10-CM

## 2023-10-05 PROCEDURE — 2580000003 HC RX 258: Performed by: ANESTHESIOLOGY

## 2023-10-05 PROCEDURE — 2500000003 HC RX 250 WO HCPCS: Performed by: NURSE ANESTHETIST, CERTIFIED REGISTERED

## 2023-10-05 PROCEDURE — 7100000010 HC PHASE II RECOVERY - FIRST 15 MIN: Performed by: INTERNAL MEDICINE

## 2023-10-05 PROCEDURE — 88305 TISSUE EXAM BY PATHOLOGIST: CPT | Performed by: PATHOLOGY

## 2023-10-05 PROCEDURE — 3609010600 HC COLONOSCOPY POLYPECTOMY SNARE/COLD BIOPSY: Performed by: INTERNAL MEDICINE

## 2023-10-05 PROCEDURE — 7100000011 HC PHASE II RECOVERY - ADDTL 15 MIN: Performed by: INTERNAL MEDICINE

## 2023-10-05 PROCEDURE — 6360000002 HC RX W HCPCS: Performed by: NURSE ANESTHETIST, CERTIFIED REGISTERED

## 2023-10-05 PROCEDURE — 3700000000 HC ANESTHESIA ATTENDED CARE: Performed by: INTERNAL MEDICINE

## 2023-10-05 PROCEDURE — 3700000001 HC ADD 15 MINUTES (ANESTHESIA): Performed by: INTERNAL MEDICINE

## 2023-10-05 PROCEDURE — 2709999900 HC NON-CHARGEABLE SUPPLY: Performed by: INTERNAL MEDICINE

## 2023-10-05 RX ORDER — SODIUM CHLORIDE 0.9 % (FLUSH) 0.9 %
5-40 SYRINGE (ML) INJECTION PRN
Status: CANCELLED | OUTPATIENT
Start: 2023-10-05

## 2023-10-05 RX ORDER — DIPHENHYDRAMINE HYDROCHLORIDE 50 MG/ML
12.5 INJECTION INTRAMUSCULAR; INTRAVENOUS
Status: CANCELLED | OUTPATIENT
Start: 2023-10-05 | End: 2023-10-06

## 2023-10-05 RX ORDER — LIDOCAINE HYDROCHLORIDE 20 MG/ML
INJECTION, SOLUTION EPIDURAL; INFILTRATION; INTRACAUDAL; PERINEURAL PRN
Status: DISCONTINUED | OUTPATIENT
Start: 2023-10-05 | End: 2023-10-05 | Stop reason: SDUPTHER

## 2023-10-05 RX ORDER — SODIUM CHLORIDE 0.9 % (FLUSH) 0.9 %
5-40 SYRINGE (ML) INJECTION EVERY 12 HOURS SCHEDULED
Status: CANCELLED | OUTPATIENT
Start: 2023-10-05

## 2023-10-05 RX ORDER — SODIUM CHLORIDE, SODIUM LACTATE, POTASSIUM CHLORIDE, CALCIUM CHLORIDE 600; 310; 30; 20 MG/100ML; MG/100ML; MG/100ML; MG/100ML
INJECTION, SOLUTION INTRAVENOUS CONTINUOUS
Status: DISCONTINUED | OUTPATIENT
Start: 2023-10-05 | End: 2023-10-05 | Stop reason: HOSPADM

## 2023-10-05 RX ORDER — SODIUM CHLORIDE, SODIUM LACTATE, POTASSIUM CHLORIDE, CALCIUM CHLORIDE 600; 310; 30; 20 MG/100ML; MG/100ML; MG/100ML; MG/100ML
INJECTION, SOLUTION INTRAVENOUS CONTINUOUS
Status: CANCELLED | OUTPATIENT
Start: 2023-10-05

## 2023-10-05 RX ORDER — FENTANYL CITRATE 50 UG/ML
25 INJECTION, SOLUTION INTRAMUSCULAR; INTRAVENOUS EVERY 5 MIN PRN
Status: CANCELLED | OUTPATIENT
Start: 2023-10-05

## 2023-10-05 RX ORDER — ONDANSETRON 2 MG/ML
4 INJECTION INTRAMUSCULAR; INTRAVENOUS
Status: CANCELLED | OUTPATIENT
Start: 2023-10-05 | End: 2023-10-06

## 2023-10-05 RX ORDER — PROPOFOL 10 MG/ML
INJECTION, EMULSION INTRAVENOUS PRN
Status: DISCONTINUED | OUTPATIENT
Start: 2023-10-05 | End: 2023-10-05 | Stop reason: SDUPTHER

## 2023-10-05 RX ORDER — DROPERIDOL 2.5 MG/ML
0.62 INJECTION, SOLUTION INTRAMUSCULAR; INTRAVENOUS EVERY 10 MIN PRN
Status: CANCELLED | OUTPATIENT
Start: 2023-10-05

## 2023-10-05 RX ADMIN — PROPOFOL 120 MG: 10 INJECTION, EMULSION INTRAVENOUS at 10:27

## 2023-10-05 RX ADMIN — SODIUM CHLORIDE, POTASSIUM CHLORIDE, SODIUM LACTATE AND CALCIUM CHLORIDE: 600; 310; 30; 20 INJECTION, SOLUTION INTRAVENOUS at 09:37

## 2023-10-05 RX ADMIN — LIDOCAINE HYDROCHLORIDE 40 MG: 20 INJECTION, SOLUTION EPIDURAL; INFILTRATION; INTRACAUDAL; PERINEURAL at 10:27

## 2023-10-05 ASSESSMENT — PAIN SCALES - GENERAL: PAINLEVEL_OUTOF10: 0

## 2023-10-05 ASSESSMENT — ENCOUNTER SYMPTOMS: SHORTNESS OF BREATH: 0

## 2023-10-05 NOTE — DISCHARGE INSTRUCTIONS
COLONOSCOPY    DR. Castellon McArthur      OFFICE NUMBER [phone]    FOLLOW UP APPOINTMENT AS SCHEDULED. REPEAT PROCEDURE IN 5 YEARS. TEST ORDERED: NONE. What to expect at home: Your Recovery   Your doctor will tell you when you can eat and do your other usual activities Your doctor will talk to you about when you will need your next colonoscopy. Your doctor can help you decide how often you need to be checked. This will depend on the results of your test and your risk for colorectal cancer. After the test, you may be bloated or have gas pains. You may need to pass gas. If a biopsy was done or a polyp was removed, you may have streaks of blood in your stool (feces) for a few days. This care sheet gives you a general idea about how long it will take for you to recover. But each person recovers at a different pace. Follow the steps below to get better as quickly as possible. How can you care for yourself at home? Activity  Rest when you feel tired. Diet  Follow your doctor's directions for eating. Unless your doctor has told you not to, drink plenty of fluids. This helps to replace the fluids that were lost during the colon prep. DO NOT DRINK ALCOHOL. Medicines  Your doctor will tell you if and when you can restart your medicines. He or she will also give you instructions about taking any new medicines. If you take blood thinners, such as warfarin (Coumadin), clopidogrel (Plavix), or aspirin, be sure to talk to your doctor. He or she will tell you if and when to start taking those medicines again. Make sure that you understand exactly what your doctor wants you to do. If polyps were removed or a biopsy was done during the test, your doctor may tell you not to take aspirin or other anti-inflammatory medicines for a few days. These include ibuprofen (Advil, Motrin) and naproxen (Aleve). Other instructions: Anethesia  For your safety, do not drive or operate machinery for 24 hours.   Do not sign legal equipment. Do not drink any alcoholic beverages. Do not smoke while alone. Avoid making important decisions. Plan to spend a quiet, relaxed evening @ home. Resume normal activities as you begin to feel better. Eat lightly for your first meal, then gradually increase your diet to what is normal for you. In case of nausea, avoid food and drink only clear liquids. Resume food as nausea ceases. Notify your surgeon if you experience fever, chills, large amount of bleeding, difficulty breathing, persistent nausea and vomiting or any other disturbing problem. Call for a follow-up appointment with your surgeon.

## 2023-10-05 NOTE — OP NOTE
Operative Note      Patient: Fanny Guerrero  YOB: 1945  MRN: [id number]    Date of Procedure: 10/5/2023    Pre-Op Diagnosis Codes:     * Abnormal PET scan of colon [R94.8]     * Diarrhea, unspecified type [R19.7]    [address]      BRIEF OP REPORT:    Impression:    1) colonoscopy showing normal rectum no mass lesions, biopsies done to rule out proctitis and also mucosal infiltration of tumor   2) moderate left-sided diverticulosis   3) small polyp 3 mm cold snared in transverse colon not retrieved  Rest of colonoscopy nondiagnostic        Suggest:   1) high-fiber diet, probiotics every day, 64 ounces of water every day   2) follow-up as needed   3) recall in 5 years     Full EGD/COLONOSCOPY report available by going to \"chart review\" then \"procedures\" then  \"EGD/Colonoscopy\"  then \"View Endoscopy Report\"      Electronically signed by Tabatha Hunter MD on 10/5/2023 at 10:47 AM

## 2023-10-05 NOTE — H&P
[address]   Pre-operative History and Physical    Patient: Mari Burnett  : 1945      History Obtained From: Patient, Nursing chart and Guardian/family members        HISTORY OF PRESENT ILLNESS:                The patient is a 78 y.o. female with significant past medical history as below who presents for C scope    Indication abnormal PET scan    Past Medical History:        Diagnosis Date    Arthritis     Back pain     pain stimulator    CAD (coronary artery disease)     Cancer ([address])     breast, treated with radiation  left    Hyperlipidemia     Hypertension     Neuropathy     Pacemaker        Past Surgical History:        Procedure Laterality Date    BACK SURGERY      fusion L2 to S1    BREAST SURGERY Left     lesion removed     CHOLECYSTECTOMY      HYSTERECTOMY (CERVIX STATUS UNKNOWN)      abdominal, still has tubes and ovaries. MASTECTOMY, BILATERAL Bilateral 10/19/2018    OTHER SURGICAL HISTORY N/A     pain stimulator    PACEMAKER INSERTION Left 2022    [employer] [place] XT DR MICHAEL Mohan PPM         Current Medications:    Medications    Prior to Admission medications    Medication Sig Start Date End Date Taking? Authorizing Provider   tamoxifen (NOLVADEX) 20 MG tablet Take 1 tablet by mouth daily 23   Andres Anne MD   apixaban (ELIQUIS) 5 MG TABS tablet Take by mouth 2 times daily Last dose 10/2/2023.     ProviderTrent MD   Carvedilol (COREG PO) Take by mouth    Trent Charlton MD   carvedilol (COREG) 12.5 MG tablet Take 1 tablet by mouth 2 times daily (with meals) 22  Ahsan MOODY MD   Calcium Carbonate-Vit D-Min (CALCIUM 1200 PO) Take 1 tablet by mouth 2 times daily    Trent Charlton MD   PARoxetine (PAXIL) 20 MG tablet TAKE 1 TABLET BY MOUTH EVERY DAY 8/15/22   Andres Anne MD   Denosumab (PROLIA SC) Inject into the skin    Trent Charlton MD   montelukast (SINGULAIR) 10 MG tablet 10

## 2023-10-05 NOTE — OP NOTE
Operative Note      Patient: Sandi Pierre  YOB: 1945  MRN: [id number]    Date of Procedure: 10/5/2023    Pre-Op Diagnosis Codes:     * Abnormal PET scan of colon [R94.8]     * Diarrhea, unspecified type [R19.7]    [facility]      BRIEF OP REPORT:    Impression:    1) colonoscopy showing a 12 mm polyp multilobulated in cecum hot snare clip applied to close the deficit  Small polyp sigmoid colon snared and retrieved   2) moderate left-sided alkalosis   3) grade 2 internal hemorrhoids  Otherwise normal colonoscopy        Suggest:   1) recommend high-fiber diet, probiotics every day, 6 4 ounces of water every day   2) follow-up 2 weeks   3) recall in 2 years     Full EGD/COLONOSCOPY report available by going to \"chart review\" then \"procedures\" then  \"EGD/Colonoscopy\"  then \"View Endoscopy Report\"      Electronically signed by Ervin Griffith MD on 10/5/2023 at 10:09 AM

## 2023-10-05 NOTE — ANESTHESIA POSTPROCEDURE EVALUATION
Department of Anesthesiology  Postprocedure Note    Patient: Merlin Torres  MRN: [id number]  YOB: 1945  Date of evaluation: 10/5/2023      Procedure Summary     Date: 10/05/23 Room / Location: [address] [place] [address] / [facility]    Anesthesia Start: 1020 Anesthesia Stop: 2308    Procedure: COLONOSCOPY POLYPECTOMY SNARE/COLD BIOPSY OF 3MM TRANSVERSE COLON POLYP THAT WAS NOT RETREVED, AND BIOPSIES TAKEN Diagnosis:       Abnormal PET scan of colon      Diarrhea, unspecified type      (Abnormal PET scan of colon [R94.8])      (Diarrhea, unspecified type [R19.7])    Surgeons: Ranjit Pascual MD Responsible Provider: Terrie Berg DO    Anesthesia Type: MAC ASA Status: 3          Anesthesia Type: No value filed.     Meredith Phase I: Meredith Score: 10    Meredith Phase II:        Anesthesia Post Evaluation    Patient location during evaluation: floor  Patient participation: complete - patient participated  Level of consciousness: awake and alert  Pain score: 0  Airway patency: patent  Nausea & Vomiting: no nausea and no vomiting  Complications: no  Cardiovascular status: hemodynamically stable  Respiratory status: room air  Hydration status: euvolemic  Pain management: adequate

## 2023-10-05 NOTE — PROGRESS NOTES
Pt. Received from endo @1053. Report taken from [facility], RN. Pt has no c/o. Pt. Given warm blankets, hot tea and modesto crackers. Pt's abdomen soft and non-tender to palpation. Zahida, friend, at bedside, call light in reach. 1110 in to check on pt. Pt denies c/o, needs. Friend at bedside, call light in reach. 1120 pt up to restroom. Pt voided w/o difficulty. Pt. Tolerated well and back to room. Pt ready to get dressed for d/c to home. 1140 d/c instructions given to pt and friend. Both verbalized understanding and denied any questions. 1150 Dr. Chitra Sauceda in patient to discuss procedure and POC.    Pt. D/c to home per w/c via vips @ 1200

## 2023-11-29 ENCOUNTER — TELEPHONE (OUTPATIENT)
Dept: INFUSION THERAPY | Age: 78
End: 2023-11-29

## 2023-11-29 DIAGNOSIS — M80.00XD AGE-RELATED OSTEOPOROSIS WITH CURRENT PATHOLOGICAL FRACTURE WITH ROUTINE HEALING: ICD-10-CM

## 2023-11-29 RX ORDER — DIPHENHYDRAMINE HYDROCHLORIDE 50 MG/ML
50 INJECTION INTRAMUSCULAR; INTRAVENOUS
Status: CANCELLED | OUTPATIENT
Start: 2023-11-29

## 2023-11-29 RX ORDER — EPINEPHRINE 1 MG/ML
0.3 INJECTION, SOLUTION, CONCENTRATE INTRAVENOUS PRN
Status: CANCELLED | OUTPATIENT
Start: 2023-11-29

## 2023-11-29 RX ORDER — ACETAMINOPHEN 325 MG/1
650 TABLET ORAL
Status: CANCELLED | OUTPATIENT
Start: 2023-11-29

## 2023-11-29 RX ORDER — ALBUTEROL SULFATE 90 UG/1
4 AEROSOL, METERED RESPIRATORY (INHALATION) PRN
Status: CANCELLED | OUTPATIENT
Start: 2023-11-29

## 2023-11-29 RX ORDER — ONDANSETRON 2 MG/ML
8 INJECTION INTRAMUSCULAR; INTRAVENOUS
Status: CANCELLED | OUTPATIENT
Start: 2023-11-29

## 2023-11-29 RX ORDER — SODIUM CHLORIDE 9 MG/ML
INJECTION, SOLUTION INTRAVENOUS CONTINUOUS
Status: CANCELLED | OUTPATIENT
Start: 2023-11-29

## 2023-11-29 RX ORDER — FAMOTIDINE 10 MG/ML
20 INJECTION, SOLUTION INTRAVENOUS
Status: CANCELLED | OUTPATIENT
Start: 2023-11-29

## 2023-11-29 NOTE — TELEPHONE ENCOUNTER
Breezy Triplett called requesting prolia appt. No order has been received. I call office and spoke with Loc Ordonez who is going to notify dr Germaine Lieberman to get an order faxed to us.

## 2023-12-05 ENCOUNTER — HOSPITAL ENCOUNTER (OUTPATIENT)
Dept: INFUSION THERAPY | Age: 78
Setting detail: INFUSION SERIES
Discharge: HOME | End: 2023-12-05
Payer: COMMERCIAL

## 2023-12-05 VITALS
RESPIRATION RATE: 16 BRPM | HEART RATE: 80 BPM | TEMPERATURE: 97.7 F | OXYGEN SATURATION: 97 % | SYSTOLIC BLOOD PRESSURE: 151 MMHG | DIASTOLIC BLOOD PRESSURE: 72 MMHG

## 2023-12-05 DIAGNOSIS — M80.00XD AGE-RELATED OSTEOPOROSIS WITH CURRENT PATHOLOGICAL FRACTURE WITH ROUTINE HEALING: Primary | ICD-10-CM

## 2023-12-05 PROCEDURE — 6360000002 HC RX W HCPCS: Performed by: FAMILY MEDICINE

## 2023-12-05 PROCEDURE — 96372 THER/PROPH/DIAG INJ SC/IM: CPT

## 2023-12-05 RX ORDER — FAMOTIDINE 10 MG/ML
20 INJECTION, SOLUTION INTRAVENOUS
Status: CANCELLED | OUTPATIENT
Start: 2023-12-05

## 2023-12-05 RX ORDER — ONDANSETRON 2 MG/ML
8 INJECTION INTRAMUSCULAR; INTRAVENOUS
Status: CANCELLED | OUTPATIENT
Start: 2023-12-05

## 2023-12-05 RX ORDER — ACETAMINOPHEN 325 MG/1
650 TABLET ORAL
Status: CANCELLED | OUTPATIENT
Start: 2023-12-05

## 2023-12-05 RX ORDER — DIPHENHYDRAMINE HYDROCHLORIDE 50 MG/ML
50 INJECTION INTRAMUSCULAR; INTRAVENOUS
Status: CANCELLED | OUTPATIENT
Start: 2023-12-05

## 2023-12-05 RX ORDER — ALBUTEROL SULFATE 90 UG/1
4 AEROSOL, METERED RESPIRATORY (INHALATION) PRN
Status: CANCELLED | OUTPATIENT
Start: 2023-12-05

## 2023-12-05 RX ORDER — SODIUM CHLORIDE 9 MG/ML
INJECTION, SOLUTION INTRAVENOUS CONTINUOUS
Status: CANCELLED | OUTPATIENT
Start: 2023-12-05

## 2023-12-05 RX ORDER — EPINEPHRINE 1 MG/ML
0.3 INJECTION, SOLUTION, CONCENTRATE INTRAVENOUS PRN
Status: CANCELLED | OUTPATIENT
Start: 2023-12-05

## 2023-12-05 RX ADMIN — DENOSUMAB 60 MG: 60 INJECTION SUBCUTANEOUS at 13:48

## 2024-02-07 ENCOUNTER — HOSPITAL ENCOUNTER (OUTPATIENT)
Age: 79
Discharge: HOME | End: 2024-02-07
Payer: COMMERCIAL

## 2024-02-07 ENCOUNTER — HOSPITAL ENCOUNTER (OUTPATIENT)
Dept: INFUSION THERAPY | Age: 79
Discharge: HOME | End: 2024-02-07

## 2024-02-07 DIAGNOSIS — C50.412 MALIGNANT NEOPLASM OF UPPER-OUTER QUADRANT OF LEFT FEMALE BREAST, UNSPECIFIED ESTROGEN RECEPTOR STATUS (HCC): ICD-10-CM

## 2024-02-07 LAB
ALBUMIN SERPL-MCNC: 4.3 GM/DL (ref 3.4–5)
ALP BLD-CCNC: 38 IU/L (ref 40–128)
ALT SERPL-CCNC: 13 U/L (ref 10–40)
ANION GAP SERPL CALCULATED.3IONS-SCNC: 10 MMOL/L (ref 7–16)
AST SERPL-CCNC: 18 IU/L (ref 15–37)
BASOPHILS ABSOLUTE: 0.1 K/CU MM
BASOPHILS RELATIVE PERCENT: 0.7 % (ref 0–1)
BILIRUB SERPL-MCNC: 0.3 MG/DL (ref 0–1)
BUN SERPL-MCNC: 15 MG/DL (ref 6–23)
CALCIUM SERPL-MCNC: 8.5 MG/DL (ref 8.3–10.6)
CHLORIDE BLD-SCNC: 106 MMOL/L (ref 99–110)
CHOLEST SERPL-MCNC: 189 MG/DL
CO2: 24 MMOL/L (ref 21–32)
CREAT SERPL-MCNC: 0.7 MG/DL (ref 0.6–1.1)
DIFFERENTIAL TYPE: ABNORMAL
EOSINOPHILS ABSOLUTE: 0.1 K/CU MM
EOSINOPHILS RELATIVE PERCENT: 1.6 % (ref 0–3)
GFR SERPL CREATININE-BSD FRML MDRD: >60 ML/MIN/1.73M2
GLUCOSE SERPL-MCNC: 93 MG/DL (ref 70–99)
HCT VFR BLD CALC: 37 % (ref 37–47)
HDLC SERPL-MCNC: 76 MG/DL
HEMOGLOBIN: 12 GM/DL (ref 12.5–16)
IMMATURE NEUTROPHIL %: 0.1 % (ref 0–0.43)
LDLC SERPL CALC-MCNC: 81 MG/DL
LYMPHOCYTES ABSOLUTE: 1.7 K/CU MM
LYMPHOCYTES RELATIVE PERCENT: 24.4 % (ref 24–44)
MCH RBC QN AUTO: 29.9 PG (ref 27–31)
MCHC RBC AUTO-ENTMCNC: 32.4 % (ref 32–36)
MCV RBC AUTO: 92.3 FL (ref 78–100)
MONOCYTES ABSOLUTE: 0.5 K/CU MM
MONOCYTES RELATIVE PERCENT: 7.3 % (ref 0–4)
NUCLEATED RBC %: 0 %
PDW BLD-RTO: 13.4 % (ref 11.7–14.9)
PLATELET # BLD: 289 K/CU MM (ref 140–440)
PMV BLD AUTO: 9.1 FL (ref 7.5–11.1)
POTASSIUM SERPL-SCNC: 3.9 MMOL/L (ref 3.5–5.1)
RBC # BLD: 4.01 M/CU MM (ref 4.2–5.4)
SEGMENTED NEUTROPHILS ABSOLUTE COUNT: 4.6 K/CU MM
SEGMENTED NEUTROPHILS RELATIVE PERCENT: 65.9 % (ref 36–66)
SODIUM BLD-SCNC: 140 MMOL/L (ref 135–145)
TOTAL IMMATURE NEUTOROPHIL: 0.01 K/CU MM
TOTAL NUCLEATED RBC: 0 K/CU MM
TOTAL PROTEIN: 6.2 GM/DL (ref 6.4–8.2)
TRIGL SERPL-MCNC: 161 MG/DL
WBC # BLD: 7 K/CU MM (ref 4–10.5)

## 2024-02-07 PROCEDURE — 36415 COLL VENOUS BLD VENIPUNCTURE: CPT

## 2024-02-07 PROCEDURE — 85025 COMPLETE CBC W/AUTO DIFF WBC: CPT

## 2024-02-07 PROCEDURE — 80061 LIPID PANEL: CPT

## 2024-02-07 PROCEDURE — 86300 IMMUNOASSAY TUMOR CA 15-3: CPT

## 2024-02-07 PROCEDURE — 80053 COMPREHEN METABOLIC PANEL: CPT

## 2024-02-08 LAB — CANCER AG27-29 SERPL-ACNC: 57.8 U/ML

## 2024-02-19 ENCOUNTER — OFFICE VISIT (OUTPATIENT)
Dept: ONCOLOGY | Age: 79
End: 2024-02-19
Payer: COMMERCIAL

## 2024-02-19 ENCOUNTER — HOSPITAL ENCOUNTER (OUTPATIENT)
Dept: INFUSION THERAPY | Age: 79
Discharge: HOME | End: 2024-02-19
Payer: COMMERCIAL

## 2024-02-19 VITALS
HEIGHT: 57 IN | DIASTOLIC BLOOD PRESSURE: 69 MMHG | TEMPERATURE: 97.7 F | SYSTOLIC BLOOD PRESSURE: 159 MMHG | BODY MASS INDEX: 34.61 KG/M2 | WEIGHT: 160.4 LBS | OXYGEN SATURATION: 94 % | HEART RATE: 82 BPM

## 2024-02-19 DIAGNOSIS — M85.89 OSTEOPENIA OF MULTIPLE SITES: ICD-10-CM

## 2024-02-19 DIAGNOSIS — Z78.0 MENOPAUSE: ICD-10-CM

## 2024-02-19 DIAGNOSIS — C50.412 MALIGNANT NEOPLASM OF UPPER-OUTER QUADRANT OF LEFT FEMALE BREAST, UNSPECIFIED ESTROGEN RECEPTOR STATUS (HCC): Primary | ICD-10-CM

## 2024-02-19 PROCEDURE — 99211 OFF/OP EST MAY X REQ PHY/QHP: CPT

## 2024-02-19 PROCEDURE — 99214 OFFICE O/P EST MOD 30 MIN: CPT | Performed by: INTERNAL MEDICINE

## 2024-02-19 PROCEDURE — 1123F ACP DISCUSS/DSCN MKR DOCD: CPT | Performed by: INTERNAL MEDICINE

## 2024-02-19 ASSESSMENT — PATIENT HEALTH QUESTIONNAIRE - PHQ9
SUM OF ALL RESPONSES TO PHQ QUESTIONS 1-9: 0
SUM OF ALL RESPONSES TO PHQ QUESTIONS 1-9: 0
2. FEELING DOWN, DEPRESSED OR HOPELESS: 0
SUM OF ALL RESPONSES TO PHQ QUESTIONS 1-9: 0
SUM OF ALL RESPONSES TO PHQ9 QUESTIONS 1 & 2: 0
1. LITTLE INTEREST OR PLEASURE IN DOING THINGS: 0
SUM OF ALL RESPONSES TO PHQ QUESTIONS 1-9: 0

## 2024-02-19 NOTE — PROGRESS NOTES
MA Rooming Questions  Patient: Nancy Coyle  MRN: [id number]    Date: 2/19/2024        1. Do you have any new issues?   no         2. Do you need any refills on medications?    no    3. Have you had any imaging done since your last visit?   yes - Pet Scan 08/24/23    4. Have you been hospitalized or seen in the emergency room since your last visit here?   no    5. Did the patient have a depression screening completed today? Yes    No data recorded     PHQ-9 Given to (if applicable):               PHQ-9 Score (if applicable):                     [] Positive     []  Negative              Does question #9 need addressed (if applicable)                     [] Yes    []  No               Loretta Kee MA    
Sx    Time Spent with patient for face to face, exam, education, discussing treatment options, reviewing imaging, reviewing labs, decision making, Pre-charting, and documenting today's visit, >30 mins.     ALEX

## 2024-05-03 RX ORDER — EPINEPHRINE 1 MG/ML
0.3 INJECTION, SOLUTION INTRAMUSCULAR; SUBCUTANEOUS PRN
OUTPATIENT
Start: 2024-06-06

## 2024-05-03 RX ORDER — FAMOTIDINE 10 MG/ML
20 INJECTION, SOLUTION INTRAVENOUS
OUTPATIENT
Start: 2024-06-06

## 2024-05-03 RX ORDER — DIPHENHYDRAMINE HYDROCHLORIDE 50 MG/ML
50 INJECTION INTRAMUSCULAR; INTRAVENOUS
OUTPATIENT
Start: 2024-06-06

## 2024-05-03 RX ORDER — SODIUM CHLORIDE 9 MG/ML
INJECTION, SOLUTION INTRAVENOUS CONTINUOUS
OUTPATIENT
Start: 2024-06-06

## 2024-05-03 RX ORDER — ONDANSETRON 2 MG/ML
8 INJECTION INTRAMUSCULAR; INTRAVENOUS
OUTPATIENT
Start: 2024-06-06

## 2024-05-03 RX ORDER — ACETAMINOPHEN 325 MG/1
650 TABLET ORAL
OUTPATIENT
Start: 2024-06-06

## 2024-05-03 RX ORDER — ALBUTEROL SULFATE 90 UG/1
4 AEROSOL, METERED RESPIRATORY (INHALATION) PRN
OUTPATIENT
Start: 2024-06-06

## 2024-06-06 ENCOUNTER — HOSPITAL ENCOUNTER (OUTPATIENT)
Dept: INFUSION THERAPY | Age: 79
Setting detail: INFUSION SERIES
Discharge: HOME | End: 2024-06-06
Payer: COMMERCIAL

## 2024-06-06 VITALS
OXYGEN SATURATION: 97 % | DIASTOLIC BLOOD PRESSURE: 77 MMHG | RESPIRATION RATE: 16 BRPM | SYSTOLIC BLOOD PRESSURE: 143 MMHG | HEART RATE: 74 BPM

## 2024-06-06 DIAGNOSIS — M80.00XD AGE-RELATED OSTEOPOROSIS WITH CURRENT PATHOLOGICAL FRACTURE WITH ROUTINE HEALING: Primary | ICD-10-CM

## 2024-06-06 PROCEDURE — 6360000002 HC RX W HCPCS: Performed by: FAMILY MEDICINE

## 2024-06-06 PROCEDURE — 96372 THER/PROPH/DIAG INJ SC/IM: CPT

## 2024-06-06 RX ORDER — ALBUTEROL SULFATE 90 UG/1
4 AEROSOL, METERED RESPIRATORY (INHALATION) PRN
Status: CANCELLED | OUTPATIENT
Start: 2024-06-06

## 2024-06-06 RX ORDER — FAMOTIDINE 10 MG/ML
20 INJECTION, SOLUTION INTRAVENOUS
Status: CANCELLED | OUTPATIENT
Start: 2024-06-06

## 2024-06-06 RX ORDER — ACETAMINOPHEN 325 MG/1
650 TABLET ORAL
Status: CANCELLED | OUTPATIENT
Start: 2024-06-06

## 2024-06-06 RX ORDER — DIPHENHYDRAMINE HYDROCHLORIDE 50 MG/ML
50 INJECTION INTRAMUSCULAR; INTRAVENOUS
Status: CANCELLED | OUTPATIENT
Start: 2024-06-06

## 2024-06-06 RX ORDER — EPINEPHRINE 1 MG/ML
0.3 INJECTION, SOLUTION INTRAMUSCULAR; SUBCUTANEOUS PRN
Status: CANCELLED | OUTPATIENT
Start: 2024-06-06

## 2024-06-06 RX ORDER — SODIUM CHLORIDE 9 MG/ML
INJECTION, SOLUTION INTRAVENOUS CONTINUOUS
Status: CANCELLED | OUTPATIENT
Start: 2024-06-06

## 2024-06-06 RX ORDER — ONDANSETRON 2 MG/ML
8 INJECTION INTRAMUSCULAR; INTRAVENOUS
Status: CANCELLED | OUTPATIENT
Start: 2024-06-06

## 2024-06-06 RX ADMIN — DENOSUMAB 60 MG: 60 INJECTION SUBCUTANEOUS at 13:05

## 2024-08-21 ENCOUNTER — HOSPITAL ENCOUNTER (OUTPATIENT)
Dept: INFUSION THERAPY | Age: 79
Discharge: HOME | End: 2024-08-21
Payer: COMMERCIAL

## 2024-08-21 DIAGNOSIS — C50.412 MALIGNANT NEOPLASM OF UPPER-OUTER QUADRANT OF LEFT FEMALE BREAST, UNSPECIFIED ESTROGEN RECEPTOR STATUS (HCC): ICD-10-CM

## 2024-08-21 DIAGNOSIS — Z78.0 MENOPAUSE: ICD-10-CM

## 2024-08-21 DIAGNOSIS — M85.89 OSTEOPENIA OF MULTIPLE SITES: ICD-10-CM

## 2024-08-21 LAB
ALBUMIN SERPL-MCNC: 4.5 GM/DL (ref 3.4–5)
ALP BLD-CCNC: 45 IU/L (ref 40–128)
ALT SERPL-CCNC: 15 U/L (ref 10–40)
ANION GAP SERPL CALCULATED.3IONS-SCNC: 15 MMOL/L (ref 7–16)
AST SERPL-CCNC: 20 IU/L (ref 15–37)
BASOPHILS ABSOLUTE: 0.1 K/CU MM
BASOPHILS RELATIVE PERCENT: 1.1 % (ref 0–1)
BILIRUB SERPL-MCNC: 0.3 MG/DL (ref 0–1)
BUN SERPL-MCNC: 17 MG/DL (ref 6–23)
CALCIUM SERPL-MCNC: 9.4 MG/DL (ref 8.3–10.6)
CHLORIDE BLD-SCNC: 103 MMOL/L (ref 99–110)
CO2: 22 MMOL/L (ref 21–32)
CREAT SERPL-MCNC: 0.9 MG/DL (ref 0.6–1.1)
DIFFERENTIAL TYPE: ABNORMAL
EOSINOPHILS ABSOLUTE: 0.2 K/CU MM
EOSINOPHILS RELATIVE PERCENT: 3 % (ref 0–3)
GFR, ESTIMATED: 65 ML/MIN/1.73M2
GLUCOSE SERPL-MCNC: 112 MG/DL (ref 70–99)
HCT VFR BLD CALC: 36.3 % (ref 37–47)
HEMOGLOBIN: 11.7 GM/DL (ref 12.5–16)
LYMPHOCYTES ABSOLUTE: 2.2 K/CU MM
LYMPHOCYTES RELATIVE PERCENT: 33.5 % (ref 24–44)
MCH RBC QN AUTO: 28.3 PG (ref 27–31)
MCHC RBC AUTO-ENTMCNC: 32.2 % (ref 32–36)
MCV RBC AUTO: 87.7 FL (ref 78–100)
MONOCYTES ABSOLUTE: 0.8 K/CU MM
MONOCYTES RELATIVE PERCENT: 12.5 % (ref 0–4)
NEUTROPHILS ABSOLUTE: 3.2 K/CU MM
NEUTROPHILS RELATIVE PERCENT: 49.9 % (ref 36–66)
PDW BLD-RTO: 14.9 % (ref 11.7–14.9)
PLATELET # BLD: 278 K/CU MM (ref 140–440)
PMV BLD AUTO: 9.1 FL (ref 7.5–11.1)
POTASSIUM SERPL-SCNC: 4.3 MMOL/L (ref 3.5–5.1)
RBC # BLD: 4.14 M/CU MM (ref 4.2–5.4)
SODIUM BLD-SCNC: 140 MMOL/L (ref 135–145)
TOTAL PROTEIN: 7.2 GM/DL (ref 6.4–8.2)
WBC # BLD: 6.4 K/CU MM (ref 4–10.5)

## 2024-08-21 PROCEDURE — 80053 COMPREHEN METABOLIC PANEL: CPT

## 2024-08-21 PROCEDURE — 86300 IMMUNOASSAY TUMOR CA 15-3: CPT

## 2024-08-21 PROCEDURE — 85025 COMPLETE CBC W/AUTO DIFF WBC: CPT

## 2024-08-21 PROCEDURE — 36415 COLL VENOUS BLD VENIPUNCTURE: CPT

## 2024-08-23 LAB — CANCER AG27-29 SERPL-ACNC: 49.8 U/ML

## 2024-08-28 ENCOUNTER — OFFICE VISIT (OUTPATIENT)
Dept: ONCOLOGY | Age: 79
End: 2024-08-28
Payer: COMMERCIAL

## 2024-08-28 ENCOUNTER — HOSPITAL ENCOUNTER (OUTPATIENT)
Dept: INFUSION THERAPY | Age: 79
Discharge: HOME | End: 2024-08-28
Payer: COMMERCIAL

## 2024-08-28 VITALS
WEIGHT: 160.8 LBS | HEIGHT: 57 IN | DIASTOLIC BLOOD PRESSURE: 60 MMHG | BODY MASS INDEX: 34.69 KG/M2 | HEART RATE: 73 BPM | TEMPERATURE: 98 F | SYSTOLIC BLOOD PRESSURE: 128 MMHG | OXYGEN SATURATION: 98 %

## 2024-08-28 DIAGNOSIS — C50.412 MALIGNANT NEOPLASM OF UPPER-OUTER QUADRANT OF LEFT FEMALE BREAST, UNSPECIFIED ESTROGEN RECEPTOR STATUS (HCC): Primary | ICD-10-CM

## 2024-08-28 PROCEDURE — 99213 OFFICE O/P EST LOW 20 MIN: CPT | Performed by: INTERNAL MEDICINE

## 2024-08-28 PROCEDURE — 99211 OFF/OP EST MAY X REQ PHY/QHP: CPT

## 2024-08-28 PROCEDURE — 1123F ACP DISCUSS/DSCN MKR DOCD: CPT | Performed by: INTERNAL MEDICINE

## 2024-08-28 NOTE — PROGRESS NOTES
MA Rooming Questions  Patient: Nancy Coyle  MRN: [id number]    Date: 8/28/2024        1. Do you have any new issues?   no         2. Do you need any refills on medications?    no    3. Have you had any imaging done since your last visit?   no    4. Have you been hospitalized or seen in the emergency room since your last visit here?   no    5. Did the patient have a depression screening completed today? No    No data recorded     PHQ-9 Given to (if applicable):               PHQ-9 Score (if applicable):                     [] Positive     []  Negative              Does question #9 need addressed (if applicable)                     [] Yes    []  No               Kylie Sweet MA

## 2024-08-28 NOTE — PROGRESS NOTES
Patient Name: Nancy Coyle  Patient : 1945  Patient MRN: [id number]     Primary Oncologist: Cindy Harris MD       Date of Service: 2024      Chief Complaint:   Chief Complaint   Patient presents with    Follow-up        Active Problem list  1. 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 Ultrasound and biopsy was done revealing invasive ductal carcinoma at 11 o'clock position subsequently she underwent bilateral mastectomy on 2018 right breast only showed fibrocystic changes left breast 8 mm invasive ductal carcinoma histologic grade 3 ER +85% NC +15% HER-2 by FISH was negative negative for any lymphovascular invasion 7 axillary nodes were negative [id number].  Visit here on 2018 did spend time with her and her friend. We basically talked about early stage carcinoma of the breast. We talked about most likely just treating her with hormonal agent primarily letrozole.    Bone density done on 2018 had revealed mild osteopenia    She was seen in the office on January 10, 2019. Was taking letrozole on a regular basis. In general had tolerated those extremely well occasional issue with hot flashes otherwise no significant muscle or joint discomfort.       She was seen in the office on 2019, per Martha for symptoms of significant worsening of muscle and joint discomfort that has started to affect her day-to-day life. Decision was made to hold off letrozole for a couple of weeks.    She was seen in the office on 2019 being off of letrozole she was doing extremely well. She was able to do  patient for face to face, exam, education, discussing treatment options, reviewing imaging, reviewing labs, decision making, Pre-charting, and documenting today's visit, 22 mins.     ALEX

## 2024-11-08 RX ORDER — ACETAMINOPHEN 325 MG/1
650 TABLET ORAL
OUTPATIENT
Start: 2024-12-09

## 2024-11-08 RX ORDER — EPINEPHRINE 1 MG/ML
0.3 INJECTION, SOLUTION INTRAMUSCULAR; SUBCUTANEOUS PRN
OUTPATIENT
Start: 2024-12-09

## 2024-11-08 RX ORDER — ONDANSETRON 2 MG/ML
8 INJECTION INTRAMUSCULAR; INTRAVENOUS
OUTPATIENT
Start: 2024-12-09

## 2024-11-08 RX ORDER — DIPHENHYDRAMINE HYDROCHLORIDE 50 MG/ML
50 INJECTION INTRAMUSCULAR; INTRAVENOUS
OUTPATIENT
Start: 2024-12-09

## 2024-11-08 RX ORDER — ALBUTEROL SULFATE 90 UG/1
4 INHALANT RESPIRATORY (INHALATION) PRN
OUTPATIENT
Start: 2024-12-09

## 2024-11-08 RX ORDER — SODIUM CHLORIDE 9 MG/ML
INJECTION, SOLUTION INTRAVENOUS CONTINUOUS
OUTPATIENT
Start: 2024-12-09

## 2024-11-08 RX ORDER — FAMOTIDINE 10 MG/ML
20 INJECTION, SOLUTION INTRAVENOUS
OUTPATIENT
Start: 2024-12-09

## 2024-12-09 ENCOUNTER — HOSPITAL ENCOUNTER (OUTPATIENT)
Dept: INFUSION THERAPY | Age: 79
Setting detail: INFUSION SERIES
Discharge: HOME | End: 2024-12-09
Payer: COMMERCIAL

## 2024-12-09 VITALS
TEMPERATURE: 97.8 F | RESPIRATION RATE: 16 BRPM | DIASTOLIC BLOOD PRESSURE: 62 MMHG | OXYGEN SATURATION: 98 % | HEART RATE: 84 BPM | SYSTOLIC BLOOD PRESSURE: 150 MMHG

## 2024-12-09 DIAGNOSIS — M80.00XD AGE-RELATED OSTEOPOROSIS WITH CURRENT PATHOLOGICAL FRACTURE WITH ROUTINE HEALING: Primary | ICD-10-CM

## 2024-12-09 PROCEDURE — 96372 THER/PROPH/DIAG INJ SC/IM: CPT

## 2024-12-09 PROCEDURE — 6360000002 HC RX W HCPCS

## 2024-12-09 RX ORDER — DIPHENHYDRAMINE HYDROCHLORIDE 50 MG/ML
50 INJECTION INTRAMUSCULAR; INTRAVENOUS
Status: CANCELLED | OUTPATIENT
Start: 2024-12-09

## 2024-12-09 RX ORDER — ACETAMINOPHEN 325 MG/1
650 TABLET ORAL
Status: CANCELLED | OUTPATIENT
Start: 2024-12-09

## 2024-12-09 RX ORDER — ONDANSETRON 2 MG/ML
8 INJECTION INTRAMUSCULAR; INTRAVENOUS
Status: CANCELLED | OUTPATIENT
Start: 2024-12-09

## 2024-12-09 RX ORDER — FAMOTIDINE 10 MG/ML
20 INJECTION, SOLUTION INTRAVENOUS
Status: CANCELLED | OUTPATIENT
Start: 2024-12-09

## 2024-12-09 RX ORDER — EPINEPHRINE 1 MG/ML
0.3 INJECTION, SOLUTION INTRAMUSCULAR; SUBCUTANEOUS PRN
Status: CANCELLED | OUTPATIENT
Start: 2024-12-09

## 2024-12-09 RX ORDER — ALBUTEROL SULFATE 90 UG/1
4 INHALANT RESPIRATORY (INHALATION) PRN
Status: CANCELLED | OUTPATIENT
Start: 2024-12-09

## 2024-12-09 RX ORDER — HYDROCORTISONE SODIUM SUCCINATE 100 MG/2ML
100 INJECTION INTRAMUSCULAR; INTRAVENOUS
Status: CANCELLED | OUTPATIENT
Start: 2024-12-09

## 2024-12-09 RX ORDER — SODIUM CHLORIDE 9 MG/ML
INJECTION, SOLUTION INTRAVENOUS CONTINUOUS
Status: CANCELLED | OUTPATIENT
Start: 2024-12-09

## 2024-12-09 RX ADMIN — DENOSUMAB 60 MG: 60 INJECTION SUBCUTANEOUS at 12:52

## 2025-05-09 RX ORDER — EPINEPHRINE 1 MG/ML
0.3 INJECTION, SOLUTION INTRAMUSCULAR; SUBCUTANEOUS PRN
OUTPATIENT
Start: 2025-06-10

## 2025-05-09 RX ORDER — DIPHENHYDRAMINE HYDROCHLORIDE 50 MG/ML
50 INJECTION, SOLUTION INTRAMUSCULAR; INTRAVENOUS
OUTPATIENT
Start: 2025-06-10

## 2025-05-09 RX ORDER — SODIUM CHLORIDE 9 MG/ML
INJECTION, SOLUTION INTRAVENOUS CONTINUOUS
OUTPATIENT
Start: 2025-06-10

## 2025-05-09 RX ORDER — ACETAMINOPHEN 325 MG/1
650 TABLET ORAL
OUTPATIENT
Start: 2025-06-10

## 2025-05-09 RX ORDER — ALBUTEROL SULFATE 90 UG/1
4 INHALANT RESPIRATORY (INHALATION) PRN
OUTPATIENT
Start: 2025-06-10

## 2025-05-09 RX ORDER — FAMOTIDINE 10 MG/ML
20 INJECTION, SOLUTION INTRAVENOUS
OUTPATIENT
Start: 2025-06-10

## 2025-05-09 RX ORDER — HYDROCORTISONE SODIUM SUCCINATE 100 MG/2ML
100 INJECTION INTRAMUSCULAR; INTRAVENOUS
OUTPATIENT
Start: 2025-06-10

## 2025-05-09 RX ORDER — ONDANSETRON 2 MG/ML
8 INJECTION INTRAMUSCULAR; INTRAVENOUS
OUTPATIENT
Start: 2025-06-10

## 2025-06-10 ENCOUNTER — HOSPITAL ENCOUNTER (OUTPATIENT)
Dept: INFUSION THERAPY | Age: 80
Setting detail: INFUSION SERIES
Discharge: HOME | End: 2025-06-10
Payer: COMMERCIAL

## 2025-06-10 VITALS
RESPIRATION RATE: 16 BRPM | OXYGEN SATURATION: 98 % | DIASTOLIC BLOOD PRESSURE: 64 MMHG | TEMPERATURE: 98 F | HEART RATE: 69 BPM | SYSTOLIC BLOOD PRESSURE: 138 MMHG

## 2025-06-10 DIAGNOSIS — M80.00XD AGE-RELATED OSTEOPOROSIS WITH CURRENT PATHOLOGICAL FRACTURE WITH ROUTINE HEALING: Primary | ICD-10-CM

## 2025-06-10 PROCEDURE — 6360000002 HC RX W HCPCS

## 2025-06-10 PROCEDURE — 96372 THER/PROPH/DIAG INJ SC/IM: CPT

## 2025-06-10 RX ORDER — SODIUM CHLORIDE 9 MG/ML
INJECTION, SOLUTION INTRAVENOUS CONTINUOUS
OUTPATIENT
Start: 2025-12-09

## 2025-06-10 RX ORDER — ALBUTEROL SULFATE 90 UG/1
4 INHALANT RESPIRATORY (INHALATION) PRN
OUTPATIENT
Start: 2025-12-09

## 2025-06-10 RX ORDER — EPINEPHRINE 1 MG/ML
0.3 INJECTION, SOLUTION INTRAMUSCULAR; SUBCUTANEOUS PRN
OUTPATIENT
Start: 2025-12-09

## 2025-06-10 RX ORDER — HYDROCORTISONE SODIUM SUCCINATE 100 MG/2ML
100 INJECTION INTRAMUSCULAR; INTRAVENOUS
OUTPATIENT
Start: 2025-12-09

## 2025-06-10 RX ORDER — DIPHENHYDRAMINE HYDROCHLORIDE 50 MG/ML
50 INJECTION, SOLUTION INTRAMUSCULAR; INTRAVENOUS
OUTPATIENT
Start: 2025-12-09

## 2025-06-10 RX ORDER — FAMOTIDINE 10 MG/ML
20 INJECTION, SOLUTION INTRAVENOUS
OUTPATIENT
Start: 2025-12-09

## 2025-06-10 RX ORDER — ONDANSETRON 2 MG/ML
8 INJECTION INTRAMUSCULAR; INTRAVENOUS
OUTPATIENT
Start: 2025-12-09

## 2025-06-10 RX ORDER — ACETAMINOPHEN 325 MG/1
650 TABLET ORAL
OUTPATIENT
Start: 2025-12-09

## 2025-06-10 RX ADMIN — DENOSUMAB 60 MG: 60 INJECTION SUBCUTANEOUS at 13:09

## 2025-08-29 ENCOUNTER — HOSPITAL ENCOUNTER (OUTPATIENT)
Dept: INFUSION THERAPY | Age: 80
Discharge: HOME | End: 2025-08-29
Payer: COMMERCIAL

## 2025-08-29 DIAGNOSIS — C50.412 MALIGNANT NEOPLASM OF UPPER-OUTER QUADRANT OF LEFT BREAST IN FEMALE, ESTROGEN RECEPTOR NEGATIVE (HCC): ICD-10-CM

## 2025-08-29 DIAGNOSIS — Z17.1 MALIGNANT NEOPLASM OF UPPER-OUTER QUADRANT OF LEFT BREAST IN FEMALE, ESTROGEN RECEPTOR NEGATIVE (HCC): ICD-10-CM

## 2025-08-29 DIAGNOSIS — C50.412 MALIGNANT NEOPLASM OF UPPER-OUTER QUADRANT OF LEFT BREAST IN FEMALE, ESTROGEN RECEPTOR NEGATIVE (HCC): Primary | ICD-10-CM

## 2025-08-29 DIAGNOSIS — Z17.1 MALIGNANT NEOPLASM OF UPPER-OUTER QUADRANT OF LEFT BREAST IN FEMALE, ESTROGEN RECEPTOR NEGATIVE (HCC): Primary | ICD-10-CM

## 2025-08-29 LAB
ALBUMIN SERPL-MCNC: 4.3 G/DL (ref 3.4–5)
ALBUMIN/GLOB SERPL: 1.6 {RATIO} (ref 1.1–2.2)
ALP SERPL-CCNC: 45 U/L (ref 40–129)
ALT SERPL-CCNC: 15 U/L (ref 10–40)
ANION GAP SERPL CALCULATED.3IONS-SCNC: 13 MMOL/L (ref 9–17)
AST SERPL-CCNC: 21 U/L (ref 15–37)
BASOPHILS # BLD: 0.04 K/UL
BASOPHILS NFR BLD: 1 % (ref 0–1)
BILIRUB SERPL-MCNC: 0.4 MG/DL (ref 0–1)
BUN SERPL-MCNC: 17 MG/DL (ref 7–20)
CALCIUM SERPL-MCNC: 9.4 MG/DL (ref 8.3–10.6)
CHLORIDE SERPL-SCNC: 102 MMOL/L (ref 99–110)
CO2 SERPL-SCNC: 24 MMOL/L (ref 21–32)
CREAT SERPL-MCNC: 1 MG/DL (ref 0.6–1.2)
EOSINOPHIL # BLD: 0.15 K/UL
EOSINOPHILS RELATIVE PERCENT: 2 % (ref 0–3)
ERYTHROCYTE [DISTWIDTH] IN BLOOD BY AUTOMATED COUNT: 15.3 % (ref 11.7–14.9)
GFR, ESTIMATED: 56 ML/MIN/1.73M2
GLUCOSE SERPL-MCNC: 97 MG/DL (ref 74–99)
HCT VFR BLD AUTO: 36.3 % (ref 37–47)
HGB BLD-MCNC: 11.4 G/DL (ref 12.5–16)
LYMPHOCYTES NFR BLD: 2.15 K/UL
LYMPHOCYTES RELATIVE PERCENT: 35 % (ref 24–44)
MCH RBC QN AUTO: 27 PG (ref 27–31)
MCHC RBC AUTO-ENTMCNC: 31.4 G/DL (ref 32–36)
MCV RBC AUTO: 86 FL (ref 78–100)
MONOCYTES NFR BLD: 0.72 K/UL
MONOCYTES NFR BLD: 12 % (ref 0–5)
NEUTROPHILS NFR BLD: 50 % (ref 36–66)
NEUTS SEG NFR BLD: 3.09 K/UL
PLATELET # BLD AUTO: 285 K/UL (ref 140–440)
PMV BLD AUTO: 8.7 FL (ref 7.5–11.1)
POTASSIUM SERPL-SCNC: 4.1 MMOL/L (ref 3.5–5.1)
PROT SERPL-MCNC: 6.9 G/DL (ref 6.4–8.2)
RBC # BLD AUTO: 4.22 M/UL (ref 4.2–5.4)
SODIUM SERPL-SCNC: 139 MMOL/L (ref 136–145)
WBC OTHER # BLD: 6.2 K/UL (ref 4–10.5)

## 2025-08-29 PROCEDURE — 36415 COLL VENOUS BLD VENIPUNCTURE: CPT

## 2025-08-29 PROCEDURE — 85025 COMPLETE CBC W/AUTO DIFF WBC: CPT

## 2025-08-29 PROCEDURE — 80053 COMPREHEN METABOLIC PANEL: CPT

## 2025-08-29 PROCEDURE — 86300 IMMUNOASSAY TUMOR CA 15-3: CPT

## 2025-09-03 LAB
MISCELLANEOUS LAB TEST RESULT: ABNORMAL
TEST NAME: ABNORMAL

## 2025-09-05 ENCOUNTER — OFFICE VISIT (OUTPATIENT)
Dept: ONCOLOGY | Age: 80
End: 2025-09-05
Payer: COMMERCIAL

## 2025-09-05 ENCOUNTER — HOSPITAL ENCOUNTER (OUTPATIENT)
Dept: INFUSION THERAPY | Age: 80
Discharge: HOME | End: 2025-09-05
Payer: COMMERCIAL

## 2025-09-05 VITALS
DIASTOLIC BLOOD PRESSURE: 73 MMHG | TEMPERATURE: 98.7 F | OXYGEN SATURATION: 95 % | HEART RATE: 71 BPM | SYSTOLIC BLOOD PRESSURE: 164 MMHG | HEIGHT: 57 IN | BODY MASS INDEX: 34.78 KG/M2 | WEIGHT: 161.2 LBS

## 2025-09-05 DIAGNOSIS — C50.412 MALIGNANT NEOPLASM OF UPPER-OUTER QUADRANT OF LEFT FEMALE BREAST, UNSPECIFIED ESTROGEN RECEPTOR STATUS (HCC): Primary | ICD-10-CM

## 2025-09-05 PROCEDURE — 1159F MED LIST DOCD IN RCRD: CPT | Performed by: INTERNAL MEDICINE

## 2025-09-05 PROCEDURE — 99212 OFFICE O/P EST SF 10 MIN: CPT

## 2025-09-05 PROCEDURE — 1123F ACP DISCUSS/DSCN MKR DOCD: CPT | Performed by: INTERNAL MEDICINE

## 2025-09-05 PROCEDURE — 1125F AMNT PAIN NOTED PAIN PRSNT: CPT | Performed by: INTERNAL MEDICINE

## 2025-09-05 PROCEDURE — 99213 OFFICE O/P EST LOW 20 MIN: CPT | Performed by: INTERNAL MEDICINE

## 2025-09-05 ASSESSMENT — PATIENT HEALTH QUESTIONNAIRE - PHQ9
1. LITTLE INTEREST OR PLEASURE IN DOING THINGS: NOT AT ALL
SUM OF ALL RESPONSES TO PHQ QUESTIONS 1-9: 0
SUM OF ALL RESPONSES TO PHQ QUESTIONS 1-9: 0
2. FEELING DOWN, DEPRESSED OR HOPELESS: NOT AT ALL
SUM OF ALL RESPONSES TO PHQ QUESTIONS 1-9: 0
SUM OF ALL RESPONSES TO PHQ QUESTIONS 1-9: 0

## (undated) DEVICE — FORCEPS BX L240CM JAW DIA2.8MM L CAP W/ NDL MIC MESH TOOTH

## (undated) DEVICE — ENDOSCOPY KIT: Brand: MEDLINE INDUSTRIES, INC.

## (undated) DEVICE — SNARE VASC L240CM LOOP W10MM SHTH DIA2.4MM RND STIFF CLD